# Patient Record
Sex: FEMALE | Race: WHITE | NOT HISPANIC OR LATINO | URBAN - METROPOLITAN AREA
[De-identification: names, ages, dates, MRNs, and addresses within clinical notes are randomized per-mention and may not be internally consistent; named-entity substitution may affect disease eponyms.]

---

## 2019-03-06 ENCOUNTER — OUTPATIENT (OUTPATIENT)
Dept: OUTPATIENT SERVICES | Facility: HOSPITAL | Age: 69
LOS: 1 days | Discharge: HOME | End: 2019-03-06
Payer: MEDICARE

## 2019-03-06 VITALS
TEMPERATURE: 98 F | RESPIRATION RATE: 20 BRPM | HEIGHT: 62 IN | OXYGEN SATURATION: 100 % | DIASTOLIC BLOOD PRESSURE: 93 MMHG | SYSTOLIC BLOOD PRESSURE: 171 MMHG | HEART RATE: 64 BPM | WEIGHT: 205.03 LBS

## 2019-03-06 DIAGNOSIS — Z01.818 ENCOUNTER FOR OTHER PREPROCEDURAL EXAMINATION: ICD-10-CM

## 2019-03-06 DIAGNOSIS — Z29.9 ENCOUNTER FOR PROPHYLACTIC MEASURES, UNSPECIFIED: ICD-10-CM

## 2019-03-06 DIAGNOSIS — M17.11 UNILATERAL PRIMARY OSTEOARTHRITIS, RIGHT KNEE: ICD-10-CM

## 2019-03-06 DIAGNOSIS — Z98.890 OTHER SPECIFIED POSTPROCEDURAL STATES: Chronic | ICD-10-CM

## 2019-03-06 LAB
ABO RH CONFIRMATION: SIGNIFICANT CHANGE UP
ANION GAP SERPL CALC-SCNC: 6 MMOL/L — SIGNIFICANT CHANGE UP (ref 5–17)
APPEARANCE UR: CLEAR — SIGNIFICANT CHANGE UP
BACTERIA # UR AUTO: ABNORMAL
BASOPHILS # BLD AUTO: 0.04 K/UL — SIGNIFICANT CHANGE UP (ref 0–0.2)
BASOPHILS NFR BLD AUTO: 0.5 % — SIGNIFICANT CHANGE UP (ref 0–2)
BILIRUB UR-MCNC: NEGATIVE — SIGNIFICANT CHANGE UP
BLD GP AB SCN SERPL QL: SIGNIFICANT CHANGE UP
BUN SERPL-MCNC: 21 MG/DL — SIGNIFICANT CHANGE UP (ref 7–23)
CALCIUM SERPL-MCNC: 9.7 MG/DL — SIGNIFICANT CHANGE UP (ref 8.5–10.1)
CHLORIDE SERPL-SCNC: 113 MMOL/L — HIGH (ref 96–108)
CO2 SERPL-SCNC: 30 MMOL/L — SIGNIFICANT CHANGE UP (ref 22–31)
COLOR SPEC: YELLOW — SIGNIFICANT CHANGE UP
CREAT SERPL-MCNC: 0.87 MG/DL — SIGNIFICANT CHANGE UP (ref 0.5–1.3)
DIFF PNL FLD: NEGATIVE — SIGNIFICANT CHANGE UP
EOSINOPHIL # BLD AUTO: 0.34 K/UL — SIGNIFICANT CHANGE UP (ref 0–0.5)
EOSINOPHIL NFR BLD AUTO: 4.1 % — SIGNIFICANT CHANGE UP (ref 0–6)
EPI CELLS # UR: SIGNIFICANT CHANGE UP
GLUCOSE SERPL-MCNC: 88 MG/DL — SIGNIFICANT CHANGE UP (ref 70–99)
GLUCOSE UR QL: NEGATIVE MG/DL — SIGNIFICANT CHANGE UP
HCT VFR BLD CALC: 42.5 % — SIGNIFICANT CHANGE UP (ref 34.5–45)
HGB BLD-MCNC: 14.3 G/DL — SIGNIFICANT CHANGE UP (ref 11.5–15.5)
IMM GRANULOCYTES NFR BLD AUTO: 0.1 % — SIGNIFICANT CHANGE UP (ref 0–1.5)
KETONES UR-MCNC: NEGATIVE — SIGNIFICANT CHANGE UP
LEUKOCYTE ESTERASE UR-ACNC: ABNORMAL
LYMPHOCYTES # BLD AUTO: 2.37 K/UL — SIGNIFICANT CHANGE UP (ref 1–3.3)
LYMPHOCYTES # BLD AUTO: 28.3 % — SIGNIFICANT CHANGE UP (ref 13–44)
MCHC RBC-ENTMCNC: 30.8 PG — SIGNIFICANT CHANGE UP (ref 27–34)
MCHC RBC-ENTMCNC: 33.6 GM/DL — SIGNIFICANT CHANGE UP (ref 32–36)
MCV RBC AUTO: 91.4 FL — SIGNIFICANT CHANGE UP (ref 80–100)
MONOCYTES # BLD AUTO: 0.56 K/UL — SIGNIFICANT CHANGE UP (ref 0–0.9)
MONOCYTES NFR BLD AUTO: 6.7 % — SIGNIFICANT CHANGE UP (ref 2–14)
NEUTROPHILS # BLD AUTO: 5.06 K/UL — SIGNIFICANT CHANGE UP (ref 1.8–7.4)
NEUTROPHILS NFR BLD AUTO: 60.3 % — SIGNIFICANT CHANGE UP (ref 43–77)
NITRITE UR-MCNC: NEGATIVE — SIGNIFICANT CHANGE UP
NRBC # BLD: 0 /100 WBCS — SIGNIFICANT CHANGE UP (ref 0–0)
PH UR: 5 — SIGNIFICANT CHANGE UP (ref 5–8)
PLATELET # BLD AUTO: 306 K/UL — SIGNIFICANT CHANGE UP (ref 150–400)
POTASSIUM SERPL-MCNC: 4.4 MMOL/L — SIGNIFICANT CHANGE UP (ref 3.5–5.3)
POTASSIUM SERPL-SCNC: 4.4 MMOL/L — SIGNIFICANT CHANGE UP (ref 3.5–5.3)
PROT UR-MCNC: NEGATIVE MG/DL — SIGNIFICANT CHANGE UP
RBC # BLD: 4.65 M/UL — SIGNIFICANT CHANGE UP (ref 3.8–5.2)
RBC # FLD: 13.1 % — SIGNIFICANT CHANGE UP (ref 10.3–14.5)
RBC CASTS # UR COMP ASSIST: SIGNIFICANT CHANGE UP /HPF (ref 0–4)
SODIUM SERPL-SCNC: 149 MMOL/L — HIGH (ref 135–145)
SP GR SPEC: 1.01 — SIGNIFICANT CHANGE UP (ref 1.01–1.02)
TYPE + AB SCN PNL BLD: SIGNIFICANT CHANGE UP
UROBILINOGEN FLD QL: NEGATIVE MG/DL — SIGNIFICANT CHANGE UP
WBC # BLD: 8.38 K/UL — SIGNIFICANT CHANGE UP (ref 3.8–10.5)
WBC # FLD AUTO: 8.38 K/UL — SIGNIFICANT CHANGE UP (ref 3.8–10.5)
WBC UR QL: SIGNIFICANT CHANGE UP

## 2019-03-06 PROCEDURE — 93010 ELECTROCARDIOGRAM REPORT: CPT

## 2019-03-06 NOTE — H&P PST ADULT - ASSESSMENT
67 y/o female with right knee osteoarthritis. Complain of chronic right knee pain. Scheduled for right TKR.   Plan  1. Stop all NSAIDS, herbal supplements and vitamins for 7 days.  2. NPO at midnight.  3. Take the following medications ( Paxil ) with small sips of water on the morning of your procedure/surgery.  4. Use EZ sponges as directed  5. Use mupirocin as directed  6. Labs, EKG, CXR as per surgeon. STAT PT/INR on admission.  7. PMD/cardiologist visit for optimization prior to surgery as per surgeon.    CAPRINI SCORE [CLOT]  AGE RELATED RISK FACTORS                                                       MOBILITY RELATED FACTORS  [ ] Age 41-60 years                                            (1 Point)                  [ ] Bed rest                                                        (1 Point)  [ x ] Age: 61-74 years                                           (2 Points)                 [ ] Plaster cast                                                   (2 Points)  [ ] Age= 75 years                                              (3 Points)                 [ ] Bed bound for more than 72 hours                 (2 Points)    DISEASE RELATED RISK FACTORS                                               GENDER SPECIFIC FACTORS  [ ] Edema in the lower extremities                       (1 Point)                  [ ] Pregnancy                                                     (1 Point)  [ ] Varicose veins                                               (1 Point)                  [ ] Post-partum < 6 weeks                                   (1 Point)             [x ] BMI > 25 Kg/m2                                            (1 Point)                  [ ] Hormonal therapy  or oral contraception          (1 Point)                 [ ] Sepsis (in the previous month)                        (1 Point)                  [ ] History of pregnancy complications                 (1 point)  [ ] Pneumonia or serious lung disease                                               [ ] Unexplained or recurrent                     (1 Point)           (in the previous month)                               (1 Point)  [ ] Abnormal pulmonary function test                     (1 Point)                 SURGERY RELATED RISK FACTORS  [ ] Acute myocardial infarction                              (1 Point)                 [ ]  Section                                             (1 Point)  [ ] Congestive heart failure (in the previous month)  (1 Point)               [ ] Minor surgery                                                  (1 Point)   [ ] Inflammatory bowel disease                             (1 Point)                 [ ] Arthroscopic surgery                                        (2 Points)  [ ] Central venous access                                      (2 Points)                [ ] General surgery lasting more than 45 minutes   (2 Points)       [ ] Stroke (in the previous month)                          (5 Points)               [ x ] Elective arthroplasty                                         (5 Points)     ()  malignancy                                                             (2 points )                                                                                                                                      HEMATOLOGY RELATED FACTORS                                                 TRAUMA RELATED RISK FACTORS  [ ] Prior episodes of VTE                                     (3 Points)                 [ ] Fracture of the hip, pelvis, or leg                       (5 Points)  [ ] Positive family history for VTE                         (3 Points)                 [ ] Acute spinal cord injury (in the previous month)  (5 Points)  [ ] Prothrombin 41563 A                                     (3 Points)                 [ ] Paralysis  (less than 1 month)                             (5 Points)  [ ] Factor V Leiden                                             (3 Points)                  [ ] Multiple Trauma within 1 month                        (5 Points)  [ ] Lupus anticoagulants                                     (3 Points)                                                           [ ] Anticardiolipin antibodies                               (3 Points)                                                       [ ] High homocysteine in the blood                      (3 Points)                                             [ ] Other congenital or acquired thrombophilia      (3 Points)                                                [ ] Heparin induced thrombocytopenia                  (3 Points)    (  ) Malignancy                                        Total Score [    8      ] 67 y/o female with right knee osteoarthritis. Complain of chronic right knee pain. Scheduled for right TKR.   Plan  1. Stop all NSAIDS, herbal supplements and vitamins for 7 days.  2. NPO at midnight.  3. Take the following medications ( ) with small sips of water on the morning of your procedure/surgery.  4. Use EZ sponges as directed  5. Use mupirocin as directed  6. Labs, EKG, CXR as per surgeon. STAT PT/INR on admission.  7. PMD/cardiologist visit for optimization prior to surgery as per surgeon.    CAPRINI SCORE [CLOT]  AGE RELATED RISK FACTORS                                                       MOBILITY RELATED FACTORS  [ ] Age 41-60 years                                            (1 Point)                  [ ] Bed rest                                                        (1 Point)  [ x ] Age: 61-74 years                                           (2 Points)                 [ ] Plaster cast                                                   (2 Points)  [ ] Age= 75 years                                              (3 Points)                 [ ] Bed bound for more than 72 hours                 (2 Points)    DISEASE RELATED RISK FACTORS                                               GENDER SPECIFIC FACTORS  [ ] Edema in the lower extremities                       (1 Point)                  [ ] Pregnancy                                                     (1 Point)  [ ] Varicose veins                                               (1 Point)                  [ ] Post-partum < 6 weeks                                   (1 Point)             [x ] BMI > 25 Kg/m2                                            (1 Point)                  [ ] Hormonal therapy  or oral contraception          (1 Point)                 [ ] Sepsis (in the previous month)                        (1 Point)                  [ ] History of pregnancy complications                 (1 point)  [ ] Pneumonia or serious lung disease                                               [ ] Unexplained or recurrent                     (1 Point)           (in the previous month)                               (1 Point)  [ ] Abnormal pulmonary function test                     (1 Point)                 SURGERY RELATED RISK FACTORS  [ ] Acute myocardial infarction                              (1 Point)                 [ ]  Section                                             (1 Point)  [ ] Congestive heart failure (in the previous month)  (1 Point)               [ ] Minor surgery                                                  (1 Point)   [ ] Inflammatory bowel disease                             (1 Point)                 [ ] Arthroscopic surgery                                        (2 Points)  [ ] Central venous access                                      (2 Points)                [ ] General surgery lasting more than 45 minutes   (2 Points)       [ ] Stroke (in the previous month)                          (5 Points)               [ x ] Elective arthroplasty                                         (5 Points)     ()  malignancy                                                             (2 points )                                                                                                                                      HEMATOLOGY RELATED FACTORS                                                 TRAUMA RELATED RISK FACTORS  [ ] Prior episodes of VTE                                     (3 Points)                 [ ] Fracture of the hip, pelvis, or leg                       (5 Points)  [ ] Positive family history for VTE                         (3 Points)                 [ ] Acute spinal cord injury (in the previous month)  (5 Points)  [ ] Prothrombin 57783 A                                     (3 Points)                 [ ] Paralysis  (less than 1 month)                             (5 Points)  [ ] Factor V Leiden                                             (3 Points)                  [ ] Multiple Trauma within 1 month                        (5 Points)  [ ] Lupus anticoagulants                                     (3 Points)                                                           [ ] Anticardiolipin antibodies                               (3 Points)                                                       [ ] High homocysteine in the blood                      (3 Points)                                             [ ] Other congenital or acquired thrombophilia      (3 Points)                                                [ ] Heparin induced thrombocytopenia                  (3 Points)    (  ) Malignancy                                        Total Score [    8      ]

## 2019-03-06 NOTE — H&P PST ADULT - FAMILY HISTORY
Mother  Still living? Unknown  Family history of kidney cancer, Age at diagnosis: Age Unknown  Family history of diabetes mellitus in mother, Age at diagnosis: Age Unknown     Father  Still living? Unknown  Family history of COPD (chronic obstructive pulmonary disease), Age at diagnosis: Age Unknown  Family history of heart valve insufficiency, Age at diagnosis: Age Unknown     Sibling  Still living? Unknown  Family history of lung disease, Age at diagnosis: Age Unknown

## 2019-03-06 NOTE — H&P PST ADULT - PMH
Constipation    COPD (chronic obstructive pulmonary disease)  emphysema  Diabetes mellitus  metformin discontinued on 01/2019 - diet management  HTN (hypertension)    Hyperlipidemia    OA (osteoarthritis) of knee  right knee  Obesity    Proteinuria  h/o  Psoriasis    Stuttering  on Paxil  Vitamin D deficiency Constipation    COPD (chronic obstructive pulmonary disease)  emphysema  Diabetes mellitus  metformin discontinued on 01/2019 - diet management  HTN (hypertension)    Hyperlipidemia    OA (osteoarthritis) of knee  right knee  Obesity    Proteinuria  h/o  Psoriasis    Sleep apnea    Stuttering  on Paxil  Vitamin D deficiency

## 2019-03-06 NOTE — H&P PST ADULT - PSH
H/O cardiac catheterization  2016  History of breast surgery  excision of breast hematoma  S/P carpal tunnel release

## 2019-03-06 NOTE — H&P PST ADULT - HISTORY OF PRESENT ILLNESS
69 y/o female with right knee osteoarthritis. Complain of chronic right knee pain. Takes Advil with mild pain relief. Scheduled for right TKR.

## 2019-03-07 LAB
MRSA PCR RESULT.: SIGNIFICANT CHANGE UP
S AUREUS DNA NOSE QL NAA+PROBE: DETECTED

## 2019-03-21 ENCOUNTER — RESULT REVIEW (OUTPATIENT)
Age: 69
End: 2019-03-21

## 2019-03-21 ENCOUNTER — INPATIENT (INPATIENT)
Facility: HOSPITAL | Age: 69
LOS: 2 days | Discharge: NURSING, REHAB OR LONG-TERM CARE | End: 2019-03-24
Attending: ORTHOPAEDIC SURGERY | Admitting: ORTHOPAEDIC SURGERY
Payer: MEDICARE

## 2019-03-21 VITALS
HEART RATE: 68 BPM | HEIGHT: 62 IN | SYSTOLIC BLOOD PRESSURE: 140 MMHG | OXYGEN SATURATION: 100 % | TEMPERATURE: 98 F | DIASTOLIC BLOOD PRESSURE: 85 MMHG | RESPIRATION RATE: 16 BRPM | WEIGHT: 205.47 LBS

## 2019-03-21 DIAGNOSIS — Z98.890 OTHER SPECIFIED POSTPROCEDURAL STATES: Chronic | ICD-10-CM

## 2019-03-21 LAB
ANION GAP SERPL CALC-SCNC: 6 MMOL/L — SIGNIFICANT CHANGE UP (ref 5–17)
BUN SERPL-MCNC: 24 MG/DL — HIGH (ref 7–23)
CALCIUM SERPL-MCNC: 9 MG/DL — SIGNIFICANT CHANGE UP (ref 8.5–10.1)
CHLORIDE SERPL-SCNC: 108 MMOL/L — SIGNIFICANT CHANGE UP (ref 96–108)
CO2 SERPL-SCNC: 29 MMOL/L — SIGNIFICANT CHANGE UP (ref 22–31)
CREAT SERPL-MCNC: 0.96 MG/DL — SIGNIFICANT CHANGE UP (ref 0.5–1.3)
GLUCOSE SERPL-MCNC: 124 MG/DL — HIGH (ref 70–99)
HCT VFR BLD CALC: 35.2 % — SIGNIFICANT CHANGE UP (ref 34.5–45)
HGB BLD-MCNC: 11.6 G/DL — SIGNIFICANT CHANGE UP (ref 11.5–15.5)
INR BLD: 1.04 RATIO — SIGNIFICANT CHANGE UP (ref 0.88–1.16)
MCHC RBC-ENTMCNC: 30.3 PG — SIGNIFICANT CHANGE UP (ref 27–34)
MCHC RBC-ENTMCNC: 33 GM/DL — SIGNIFICANT CHANGE UP (ref 32–36)
MCV RBC AUTO: 91.9 FL — SIGNIFICANT CHANGE UP (ref 80–100)
NRBC # BLD: 0 /100 WBCS — SIGNIFICANT CHANGE UP (ref 0–0)
PLATELET # BLD AUTO: 245 K/UL — SIGNIFICANT CHANGE UP (ref 150–400)
POTASSIUM SERPL-MCNC: 3.6 MMOL/L — SIGNIFICANT CHANGE UP (ref 3.5–5.3)
POTASSIUM SERPL-SCNC: 3.6 MMOL/L — SIGNIFICANT CHANGE UP (ref 3.5–5.3)
PROTHROM AB SERPL-ACNC: 11.6 SEC — SIGNIFICANT CHANGE UP (ref 10–12.9)
RBC # BLD: 3.83 M/UL — SIGNIFICANT CHANGE UP (ref 3.8–5.2)
RBC # FLD: 13.1 % — SIGNIFICANT CHANGE UP (ref 10.3–14.5)
SODIUM SERPL-SCNC: 143 MMOL/L — SIGNIFICANT CHANGE UP (ref 135–145)
WBC # BLD: 7.38 K/UL — SIGNIFICANT CHANGE UP (ref 3.8–10.5)
WBC # FLD AUTO: 7.38 K/UL — SIGNIFICANT CHANGE UP (ref 3.8–10.5)

## 2019-03-21 PROCEDURE — 88305 TISSUE EXAM BY PATHOLOGIST: CPT | Mod: 26

## 2019-03-21 PROCEDURE — 73560 X-RAY EXAM OF KNEE 1 OR 2: CPT | Mod: 26,RT

## 2019-03-21 PROCEDURE — 99223 1ST HOSP IP/OBS HIGH 75: CPT

## 2019-03-21 RX ORDER — OXYCODONE HYDROCHLORIDE 5 MG/1
20 TABLET ORAL ONCE
Qty: 0 | Refills: 0 | Status: DISCONTINUED | OUTPATIENT
Start: 2019-03-21 | End: 2019-03-21

## 2019-03-21 RX ORDER — RIVAROXABAN 15 MG-20MG
10 KIT ORAL DAILY
Qty: 0 | Refills: 0 | Status: DISCONTINUED | OUTPATIENT
Start: 2019-03-21 | End: 2019-03-24

## 2019-03-21 RX ORDER — CELECOXIB 200 MG/1
200 CAPSULE ORAL ONCE
Qty: 0 | Refills: 0 | Status: COMPLETED | OUTPATIENT
Start: 2019-03-21 | End: 2019-03-21

## 2019-03-21 RX ORDER — LOSARTAN POTASSIUM 100 MG/1
50 TABLET, FILM COATED ORAL DAILY
Qty: 0 | Refills: 0 | Status: DISCONTINUED | OUTPATIENT
Start: 2019-03-21 | End: 2019-03-24

## 2019-03-21 RX ORDER — OXYCODONE HYDROCHLORIDE 5 MG/1
10 TABLET ORAL EVERY 4 HOURS
Qty: 0 | Refills: 0 | Status: DISCONTINUED | OUTPATIENT
Start: 2019-03-21 | End: 2019-03-22

## 2019-03-21 RX ORDER — DIPHENHYDRAMINE HCL 50 MG
25 CAPSULE ORAL EVERY 4 HOURS
Qty: 0 | Refills: 0 | Status: DISCONTINUED | OUTPATIENT
Start: 2019-03-21 | End: 2019-03-24

## 2019-03-21 RX ORDER — HYDROMORPHONE HYDROCHLORIDE 2 MG/ML
0.5 INJECTION INTRAMUSCULAR; INTRAVENOUS; SUBCUTANEOUS
Qty: 0 | Refills: 0 | Status: DISCONTINUED | OUTPATIENT
Start: 2019-03-21 | End: 2019-03-21

## 2019-03-21 RX ORDER — ACETAMINOPHEN 500 MG
650 TABLET ORAL EVERY 6 HOURS
Qty: 0 | Refills: 0 | Status: DISCONTINUED | OUTPATIENT
Start: 2019-03-21 | End: 2019-03-24

## 2019-03-21 RX ORDER — ALBUTEROL 90 UG/1
2 AEROSOL, METERED ORAL EVERY 6 HOURS
Qty: 0 | Refills: 0 | Status: DISCONTINUED | OUTPATIENT
Start: 2019-03-21 | End: 2019-03-24

## 2019-03-21 RX ORDER — ONDANSETRON 8 MG/1
4 TABLET, FILM COATED ORAL EVERY 6 HOURS
Qty: 0 | Refills: 0 | Status: DISCONTINUED | OUTPATIENT
Start: 2019-03-21 | End: 2019-03-24

## 2019-03-21 RX ORDER — PANTOPRAZOLE SODIUM 20 MG/1
40 TABLET, DELAYED RELEASE ORAL ONCE
Qty: 0 | Refills: 0 | Status: COMPLETED | OUTPATIENT
Start: 2019-03-21 | End: 2019-03-21

## 2019-03-21 RX ORDER — HYDROCHLOROTHIAZIDE 25 MG
12.5 TABLET ORAL DAILY
Qty: 0 | Refills: 0 | Status: DISCONTINUED | OUTPATIENT
Start: 2019-03-21 | End: 2019-03-21

## 2019-03-21 RX ORDER — PANTOPRAZOLE SODIUM 20 MG/1
40 TABLET, DELAYED RELEASE ORAL
Qty: 0 | Refills: 0 | Status: DISCONTINUED | OUTPATIENT
Start: 2019-03-21 | End: 2019-03-24

## 2019-03-21 RX ORDER — OXYCODONE HYDROCHLORIDE 5 MG/1
5 TABLET ORAL EVERY 4 HOURS
Qty: 0 | Refills: 0 | Status: DISCONTINUED | OUTPATIENT
Start: 2019-03-21 | End: 2019-03-22

## 2019-03-21 RX ORDER — ACETAMINOPHEN 500 MG
1000 TABLET ORAL ONCE
Qty: 0 | Refills: 0 | Status: COMPLETED | OUTPATIENT
Start: 2019-03-21 | End: 2019-03-21

## 2019-03-21 RX ORDER — DOCUSATE SODIUM 100 MG
100 CAPSULE ORAL THREE TIMES A DAY
Qty: 0 | Refills: 0 | Status: DISCONTINUED | OUTPATIENT
Start: 2019-03-21 | End: 2019-03-24

## 2019-03-21 RX ORDER — ONDANSETRON 8 MG/1
4 TABLET, FILM COATED ORAL ONCE
Qty: 0 | Refills: 0 | Status: DISCONTINUED | OUTPATIENT
Start: 2019-03-21 | End: 2019-03-21

## 2019-03-21 RX ORDER — SIMVASTATIN 20 MG/1
20 TABLET, FILM COATED ORAL AT BEDTIME
Qty: 0 | Refills: 0 | Status: DISCONTINUED | OUTPATIENT
Start: 2019-03-21 | End: 2019-03-24

## 2019-03-21 RX ORDER — ASPIRIN/CALCIUM CARB/MAGNESIUM 324 MG
325 TABLET ORAL
Qty: 0 | Refills: 0 | Status: DISCONTINUED | OUTPATIENT
Start: 2019-03-21 | End: 2019-03-21

## 2019-03-21 RX ORDER — ACETAMINOPHEN 500 MG
650 TABLET ORAL EVERY 6 HOURS
Qty: 0 | Refills: 0 | Status: DISCONTINUED | OUTPATIENT
Start: 2019-03-22 | End: 2019-03-24

## 2019-03-21 RX ORDER — OXYCODONE HYDROCHLORIDE 5 MG/1
10 TABLET ORAL ONCE
Qty: 0 | Refills: 0 | Status: DISCONTINUED | OUTPATIENT
Start: 2019-03-21 | End: 2019-03-21

## 2019-03-21 RX ORDER — DIPHENHYDRAMINE HCL 50 MG
25 CAPSULE ORAL AT BEDTIME
Qty: 0 | Refills: 0 | Status: DISCONTINUED | OUTPATIENT
Start: 2019-03-21 | End: 2019-03-24

## 2019-03-21 RX ORDER — FOLIC ACID 0.8 MG
1 TABLET ORAL DAILY
Qty: 0 | Refills: 0 | Status: DISCONTINUED | OUTPATIENT
Start: 2019-03-21 | End: 2019-03-24

## 2019-03-21 RX ORDER — MAGNESIUM HYDROXIDE 400 MG/1
30 TABLET, CHEWABLE ORAL DAILY
Qty: 0 | Refills: 0 | Status: DISCONTINUED | OUTPATIENT
Start: 2019-03-21 | End: 2019-03-24

## 2019-03-21 RX ORDER — CEFAZOLIN SODIUM 1 G
2000 VIAL (EA) INJECTION EVERY 8 HOURS
Qty: 0 | Refills: 0 | Status: COMPLETED | OUTPATIENT
Start: 2019-03-21 | End: 2019-03-22

## 2019-03-21 RX ORDER — ACETAMINOPHEN 500 MG
650 TABLET ORAL ONCE
Qty: 0 | Refills: 0 | Status: COMPLETED | OUTPATIENT
Start: 2019-03-21 | End: 2019-03-21

## 2019-03-21 RX ORDER — ASCORBIC ACID 60 MG
500 TABLET,CHEWABLE ORAL
Qty: 0 | Refills: 0 | Status: DISCONTINUED | OUTPATIENT
Start: 2019-03-21 | End: 2019-03-24

## 2019-03-21 RX ORDER — MAGNESIUM HYDROXIDE 400 MG/1
30 TABLET, CHEWABLE ORAL
Qty: 0 | Refills: 0 | Status: DISCONTINUED | OUTPATIENT
Start: 2019-03-21 | End: 2019-03-24

## 2019-03-21 RX ORDER — SODIUM CHLORIDE 9 MG/ML
1000 INJECTION, SOLUTION INTRAVENOUS
Qty: 0 | Refills: 0 | Status: DISCONTINUED | OUTPATIENT
Start: 2019-03-21 | End: 2019-03-21

## 2019-03-21 RX ORDER — POLYETHYLENE GLYCOL 3350 17 G/17G
17 POWDER, FOR SOLUTION ORAL DAILY
Qty: 0 | Refills: 0 | Status: DISCONTINUED | OUTPATIENT
Start: 2019-03-21 | End: 2019-03-24

## 2019-03-21 RX ORDER — SENNA PLUS 8.6 MG/1
2 TABLET ORAL AT BEDTIME
Qty: 0 | Refills: 0 | Status: DISCONTINUED | OUTPATIENT
Start: 2019-03-21 | End: 2019-03-24

## 2019-03-21 RX ORDER — BENZOCAINE AND MENTHOL 5; 1 G/100ML; G/100ML
1 LIQUID ORAL
Qty: 0 | Refills: 0 | Status: DISCONTINUED | OUTPATIENT
Start: 2019-03-21 | End: 2019-03-24

## 2019-03-21 RX ORDER — SODIUM CHLORIDE 9 MG/ML
1000 INJECTION, SOLUTION INTRAVENOUS
Qty: 0 | Refills: 0 | Status: DISCONTINUED | OUTPATIENT
Start: 2019-03-21 | End: 2019-03-24

## 2019-03-21 RX ORDER — FERROUS SULFATE 325(65) MG
325 TABLET ORAL
Qty: 0 | Refills: 0 | Status: DISCONTINUED | OUTPATIENT
Start: 2019-03-21 | End: 2019-03-24

## 2019-03-21 RX ORDER — TRAMADOL HYDROCHLORIDE 50 MG/1
50 TABLET ORAL EVERY 6 HOURS
Qty: 0 | Refills: 0 | Status: DISCONTINUED | OUTPATIENT
Start: 2019-03-21 | End: 2019-03-24

## 2019-03-21 RX ADMIN — ONDANSETRON 4 MILLIGRAM(S): 8 TABLET, FILM COATED ORAL at 16:06

## 2019-03-21 RX ADMIN — RIVAROXABAN 10 MILLIGRAM(S): KIT at 22:00

## 2019-03-21 RX ADMIN — OXYCODONE HYDROCHLORIDE 20 MILLIGRAM(S): 5 TABLET ORAL at 07:21

## 2019-03-21 RX ADMIN — Medication 1 APPLICATION(S): at 21:59

## 2019-03-21 RX ADMIN — CELECOXIB 200 MILLIGRAM(S): 200 CAPSULE ORAL at 07:21

## 2019-03-21 RX ADMIN — Medication 100 MILLIGRAM(S): at 16:31

## 2019-03-21 RX ADMIN — Medication 650 MILLIGRAM(S): at 07:21

## 2019-03-21 RX ADMIN — SIMVASTATIN 20 MILLIGRAM(S): 20 TABLET, FILM COATED ORAL at 22:00

## 2019-03-21 RX ADMIN — OXYCODONE HYDROCHLORIDE 10 MILLIGRAM(S): 5 TABLET ORAL at 11:53

## 2019-03-21 RX ADMIN — OXYCODONE HYDROCHLORIDE 10 MILLIGRAM(S): 5 TABLET ORAL at 12:00

## 2019-03-21 RX ADMIN — Medication 650 MILLIGRAM(S): at 23:14

## 2019-03-21 RX ADMIN — HYDROMORPHONE HYDROCHLORIDE 0.5 MILLIGRAM(S): 2 INJECTION INTRAMUSCULAR; INTRAVENOUS; SUBCUTANEOUS at 11:50

## 2019-03-21 RX ADMIN — Medication 10 MILLIGRAM(S): at 22:28

## 2019-03-21 RX ADMIN — Medication 30 MILLIGRAM(S): at 22:00

## 2019-03-21 RX ADMIN — HYDROMORPHONE HYDROCHLORIDE 0.5 MILLIGRAM(S): 2 INJECTION INTRAMUSCULAR; INTRAVENOUS; SUBCUTANEOUS at 11:36

## 2019-03-21 RX ADMIN — SODIUM CHLORIDE 75 MILLILITER(S): 9 INJECTION, SOLUTION INTRAVENOUS at 11:38

## 2019-03-21 RX ADMIN — Medication 1000 MILLIGRAM(S): at 18:09

## 2019-03-21 RX ADMIN — Medication 100 MILLIGRAM(S): at 22:00

## 2019-03-21 RX ADMIN — SODIUM CHLORIDE 75 MILLILITER(S): 9 INJECTION, SOLUTION INTRAVENOUS at 22:28

## 2019-03-21 RX ADMIN — PANTOPRAZOLE SODIUM 40 MILLIGRAM(S): 20 TABLET, DELAYED RELEASE ORAL at 16:31

## 2019-03-21 RX ADMIN — Medication 650 MILLIGRAM(S): at 23:15

## 2019-03-21 RX ADMIN — Medication 400 MILLIGRAM(S): at 17:54

## 2019-03-21 NOTE — PHYSICAL THERAPY INITIAL EVALUATION ADULT - MODALITIES TREATMENT COMMENTS
shiv tx well with no c/o during transfer, gait and therex training. left in supine, all needs in reach, alarm in place

## 2019-03-21 NOTE — PROGRESS NOTE ADULT - SUBJECTIVE AND OBJECTIVE BOX
PT seen at bedside doing well, denies N.T RLE , denies pain, no other complaints    PE Right knee   dressing c/d/i   immobilizer intact   FROM ankle and toes  + EHL FHL GS TA   SILT   DP intact

## 2019-03-21 NOTE — CONSULT NOTE ADULT - SUBJECTIVE AND OBJECTIVE BOX
Date of Service 03-21-19 @ 16:21    Patient is a 68y old  Female who presents with a chief complaint of "Right knee pain"      HPI: Pt is a 67 y/o obese female with h/o HTN, hyperlipidemia, diet controlled type 2 diabetes, COPD, hyperlipidemia, sleep apnea, osteoarthritis who has been having increasing Rt knee pain.  Since she failed conservative measures, affecting her quality of life and ADLs she was admitted for elective Rt total knee replacement.  Postop medicine consult called for comanagement.  Pt seen and examined, c/o nausea and epigastric discomfort with heart burn.  No CP or SOB.        PAST MEDICAL & SURGICAL HISTORY:  Sleep apnea  Constipation  Vitamin D deficiency  Psoriasis  OA (osteoarthritis) of knee: right knee  Stuttering: on Paxil  Diabetes mellitus: metformin discontinued on 01/2019 - diet management  Proteinuria: h/o  COPD (chronic obstructive pulmonary disease): emphysema  HTN (hypertension)  Hyperlipidemia  Obesity  H/O cardiac catheterization: 2016  History of breast surgery: excision of breast hematoma  S/P carpal tunnel release      Allergies    Zantac (Other)    Intolerances        MEDICATIONS  (STANDING):  ascorbic acid 500 milliGRAM(s) Oral two times a day  calcium carbonate 1250 mG  + Vitamin D (OsCal 500 + D) 1 Tablet(s) Oral three times a day  ceFAZolin   IVPB 2000 milliGRAM(s) IV Intermittent every 8 hours  clobetasol 0.05% Ointment 1 Application(s) Topical two times a day  docusate sodium 100 milliGRAM(s) Oral three times a day  ferrous    sulfate 325 milliGRAM(s) Oral three times a day with meals  folic acid 1 milliGRAM(s) Oral daily  hydrochlorothiazide 12.5 milliGRAM(s) Oral daily  lactated ringers. 1000 milliLiter(s) (75 mL/Hr) IV Continuous <Continuous>  losartan 50 milliGRAM(s) Oral daily  multivitamin 1 Tablet(s) Oral daily  pantoprazole    Tablet 40 milliGRAM(s) Oral before breakfast  pantoprazole  Injectable 40 milliGRAM(s) IV Push once  PARoxetine 30 milliGRAM(s) Oral daily  polyethylene glycol 3350 17 Gram(s) Oral daily  rivaroxaban 10 milliGRAM(s) Oral daily  simvastatin 20 milliGRAM(s) Oral at bedtime    MEDICATIONS  (PRN):  acetaminophen   Tablet .. 650 milliGRAM(s) Oral every 6 hours PRN Temp greater or equal to 38C (100.4F), Mild Pain (1 - 3)  acetaminophen  IVPB .. 1000 milliGRAM(s) IV Intermittent once PRN Severe Pain (7 - 10)  ALBUTerol    90 MICROgram(s) HFA Inhaler 2 Puff(s) Inhalation every 6 hours PRN Shortness of Breath and/or Wheezing  aluminum hydroxide/magnesium hydroxide/simethicone Suspension 30 milliLiter(s) Oral four times a day PRN Indigestion  benzocaine 15 mG/menthol 3.6 mG Lozenge 1 Lozenge Oral every 3 hours PRN Sore Throat  diphenhydrAMINE 25 milliGRAM(s) Oral every 4 hours PRN Rash and/or Itching  diphenhydrAMINE 25 milliGRAM(s) Oral at bedtime PRN Insomnia  magnesium hydroxide Suspension 30 milliLiter(s) Oral daily PRN Constipation  magnesium hydroxide Suspension 30 milliLiter(s) Oral four times a day PRN Constipation  ondansetron Injectable 4 milliGRAM(s) IV Push every 6 hours PRN Nausea and/or Vomiting  oxyCODONE    IR 5 milliGRAM(s) Oral every 4 hours PRN Moderate Pain (4 - 6)  oxyCODONE    IR 10 milliGRAM(s) Oral every 4 hours PRN Severe Pain (7 - 10)  senna 2 Tablet(s) Oral at bedtime PRN Constipation  traMADol 50 milliGRAM(s) Oral every 6 hours PRN Mild Pain (1 - 3)      FAMILY HISTORY:  Family history of lung disease (Sibling)  Family history of heart valve insufficiency (Father)  Family history of COPD (chronic obstructive pulmonary disease) (Father)  Family history of diabetes mellitus in mother (Mother)  Family history of kidney cancer (Mother): mother      Social History: , lives with children, former smoker (smoked 1 ppd for 40 year, quit 2016), denies ETOH use      Vital Signs Last 24 Hrs  T(C): 36.7 (21 Mar 2019 13:15), Max: 37.5 (21 Mar 2019 12:55)  T(F): 98.1 (21 Mar 2019 13:15), Max: 99.5 (21 Mar 2019 12:55)  HR: 75 (21 Mar 2019 13:15) (68 - 82)  BP: 125/70 (21 Mar 2019 13:15) (117/69 - 140/85)  BP(mean): 82 (21 Mar 2019 13:15) (82 - 82)  RR: 18 (21 Mar 2019 13:15) (10 - 19)  SpO2: 98% (21 Mar 2019 13:15) (95% - 100%)    Daily Height in cm: 157.48 (21 Mar 2019 07:02)    Daily     I&O's Summary    21 Mar 2019 07:01  -  21 Mar 2019 16:21  --------------------------------------------------------  IN: 1328 mL / OUT: 0 mL / NET: 1328 mL          Data                          11.6   7.38  )-----------( 245      ( 21 Mar 2019 11:48 )             35.2       03-21    143  |  108  |  24<H>  ----------------------------<  124<H>  3.6   |  29  |  0.96    Ca    9.0      21 Mar 2019 11:48                      PT/INR - ( 21 Mar 2019 07:00 )   PT: 11.6 sec;   INR: 1.04 ratio

## 2019-03-21 NOTE — PHYSICAL THERAPY INITIAL EVALUATION ADULT - ADDITIONAL COMMENTS
lives with family in a private home, several steps (without railing) to enter  sleeps on first floor

## 2019-03-21 NOTE — CONSULT NOTE ADULT - SUBJECTIVE AND OBJECTIVE BOX
HPI:      Patient is a 68y old  Female who presents with a chief complaint of inc right knee pain, h/o OA, Now S/P right TKR    Consulted by Dr. Rodas   for VTE prophylaxis, risk stratification, and anticoagulation management.    PAST MEDICAL & SURGICAL HISTORY:  Sleep apnea  Constipation  Vitamin D deficiency  Psoriasis  OA (osteoarthritis) of knee: right knee  Stuttering: on Paxil  Diabetes mellitus: metformin discontinued on 2019 - diet management  Proteinuria: h/o  COPD (chronic obstructive pulmonary disease): emphysema  HTN (hypertension)  Hyperlipidemia  Obesity  H/O cardiac catheterization: 2016  History of breast surgery: excision of breast hematoma  S/P carpal tunnel release          CrCl:    Caprini VTE Risk Score: CAPRINI SCORE  AGE RELATED RISK FACTORS                                                       MOBILITY RELATED FACTORS  [ ] Age 41-60 years                                            (1 Point)                  [ ] Bed rest                                                        (1 Point)  [ ] Age: 61-74 years                                           (2 Points)                [ ] Plaster cast                                                   (2 Points)  [ ] Age= 75 years                                              (3 Points)                 [ ] Bed bound for more than 72 hours                   (2 Points)    DISEASE RELATED RISK FACTORS                                               GENDER SPECIFIC FACTORS  [ ] Edema in the lower extremities                       (1 Point)           [ ] Pregnancy                                                            (1 Point)  [ ] Varicose veins                                               (1 Point)                  [ ] Post-partum < 6 weeks                                      (1 Point)             [ x] BMI > 25 Kg/m2                                            (1 Point)                  [ ] Hormonal therapy or oral contraception       (1 Point)                 [ ] Sepsis (in the previous month)                        (1 Point)             [ ] History of pregnancy complications                (1Point)  [ ] Pneumonia or serious lung disease                                             [ ] Unexplained or recurrent  (>/= 3), premature                                 (In the previous month)                               (1 Point)                birth with toxemia or growth-restricted infant (1 Point)  [ ] Abnormal pulmonary function test            (1 Point)                                   SURGERY RELATED RISK FACTORS  [ ] Acute myocardial infarction                       (1 Point)                  [ ]  Section                                         (1 Point)  [ ] Congestive heart failure (in the previous month) (1 Point)   [ ] Minor surgery   lasting <45 minutes       (1 Point)   [ ] Inflammatory bowel disease                             (1 Point)          [ ] Arthroscopic surgery                                  (2 Points)  [ ] Central venous access                                    (2 Points)            [ ] General surgery lasting >45 minutes      (2 Points)       [ ] Stroke (in the previous month)                  (5 Points)            [x ] Elective major lower extremity arthroplasty (5 Points)                                   [  ] Malignancy (present or past include skin melanoma                                          but exclude  basal skin cell)    (2 points)                                      TRAUMA RELATED RISK FACTORS                HEMATOLOGY RELATED FACTORS                                  [ ] Fracture of the hip, pelvis, or leg                       (5 Points)  [ ] Prior episodes of VTE                                     (3 Points)          [ ] Acute spinal cord injury (in the previous month)  (5 Points)  [ ] Positive family history for VTE                         (3 Points)       [ ] Paralysis (less than 1 month)                          (5 Points)  [ ] Prothrombin 50579 A                                      (3 Points)         [ ] Multiple Trauma (within 1month)                 (5Points)                                                                                                                                                                [ ] Factor V Leiden                                          (3 Points)                                OTHER RISK FACTORS                          [ ] Lupus anticoagulants                                     (3 Points)                       [ ] BMI > 40                          (1 Point)                                                         [ ] Anticardiolipin antibodies                                (3 Points)                   [ ] Smoking                              (1Point)                                                [ ] High homocysteine in the blood                      (3 Points)                [  ] Diabetes requiring insulin (1point)                         [ ] Other congenital or acquired thrombophilia       (3 Points)          [  ] Chemotherapy                   (1 Point)  [ ] Heparin induced thrombocytopenia                  (3 Points)             [  ] Blood Transfusion                (1 point)                                                                                                             Total Score [  8    ]                                                                                                                                                                                                                                                                                                                                                                                                                                           IMPROVE Bleeding Risk Score    Falls Risk:   High (  )  Mod (  )  Low (  )      FAMILY HISTORY:  Family history of lung disease (Sibling)  Family history of heart valve insufficiency (Father)  Family history of COPD (chronic obstructive pulmonary disease) (Father)  Family history of diabetes mellitus in mother (Mother)  Family history of kidney cancer (Mother): mother    Denies any personal or familial history of clotting or bleeding disorders.    Allergies    Zantac (Other)    Intolerances        REVIEW OF SYSTEMS    (  )Fever	     (  )Constipation	(  )SOB				(  )Headache	(  )Dysuria  (  )Chills	     (  )Melena	(  )Dyspnea present on exertion	                    (  )Dizziness                    (  )Polyuria  (  )Nausea	     (  )Hematochezia	(  )Cough			                    (  )Syncope   	(  )Hematuria  (  )Vomiting    (  )Chest Pain	(  )Wheezing			(  )Weakness  (  )Diarrhea     (  )Palpitations	(  )Anorexia			( x )joint pain    All  other review of systems negative: Yes          PHYSICAL EXAM:    Constitutional: Appears Well    Neurological: A& O x 3    Skin: Warm    Respiratory and Thorax: normal effort; Breath sounds: normal; No rales/wheezing/rhonchi  	  Cardiovascular: S1, S2, regular, NMBR	    Gastrointestinal: BS + x 4Q, nontender	    Genitourinary:  Bladder nondistended, nontender    Musculoskeletal:   General Right:   + muscle/joint tenderness,   normal tone, no joint swelling,   ROM: limited	    General Left:   no muscle/joint tenderness,   normal tone, no joint swelling,   ROM: lfull    Knee:  Right: Incision: ; Dressing CDI;                 Lower extrems:   Right: no calf tenderness              negative robbin's sign               + pedal pulses    Left:   no calf tenderness              negative robbin's sign               + pedal pulses                          11.6   7.38  )-----------( 245      ( 21 Mar 2019 11:48 )             35.2       03-21    143  |  108  |  24<H>  ----------------------------<  124<H>  3.6   |  29  |  0.96    Ca    9.0      21 Mar 2019 11:48        PT/INR - ( 21 Mar 2019 07:00 )   PT: 11.6 sec;   INR: 1.04 ratio         				    MEDICATIONS  (STANDING):  ascorbic acid 500 milliGRAM(s) Oral two times a day  aspirin enteric coated 325 milliGRAM(s) Oral two times a day  calcium carbonate 1250 mG  + Vitamin D (OsCal 500 + D) 1 Tablet(s) Oral three times a day  ceFAZolin   IVPB 2000 milliGRAM(s) IV Intermittent every 8 hours  clobetasol 0.05% Ointment 1 Application(s) Topical two times a day  docusate sodium 100 milliGRAM(s) Oral three times a day  ferrous    sulfate 325 milliGRAM(s) Oral three times a day with meals  folic acid 1 milliGRAM(s) Oral daily  hydrochlorothiazide 12.5 milliGRAM(s) Oral daily  lactated ringers. 1000 milliLiter(s) IV Continuous <Continuous>  losartan 50 milliGRAM(s) Oral daily  multivitamin 1 Tablet(s) Oral daily  pantoprazole    Tablet 40 milliGRAM(s) Oral before breakfast  PARoxetine 30 milliGRAM(s) Oral daily  polyethylene glycol 3350 17 Gram(s) Oral daily  simvastatin 20 milliGRAM(s) Oral at bedtime          DVT Prophylaxis:  LMWH                   (  )  Heparin SQ           (  )  Coumadin             (  )  Xarelto                  (  )  Eliquis                   (  )  Venodynes           (  )  Ambulation          (  )  UFH                       (  )  Contraindicated  (  )  EC ASPIRIN       (  ) HPI:      Patient is a 68y old  Female who presents with a chief complaint of inc right knee pain, h/o OA, Now S/P right TKR    Consulted by Dr. Rodas   for VTE prophylaxis, risk stratification, and anticoagulation management.    PAST MEDICAL & SURGICAL HISTORY:  Sleep apnea  Constipation  Vitamin D deficiency  Psoriasis  OA (osteoarthritis) of knee: right knee  Stuttering: on Paxil  Diabetes mellitus: metformin discontinued on 2019 - diet management  Proteinuria: h/o  COPD (chronic obstructive pulmonary disease): emphysema  HTN (hypertension)  Hyperlipidemia  Obesity  H/O cardiac catheterization: 2016  History of breast surgery: excision of breast hematoma  S/P carpal tunnel release          CrCl:    Caprini VTE Risk Score: CAPRINI SCORE  AGE RELATED RISK FACTORS                                                       MOBILITY RELATED FACTORS  [ ] Age 41-60 years                                            (1 Point)                  [ ] Bed rest                                                        (1 Point)  [ ] Age: 61-74 years                                           (2 Points)                [ ] Plaster cast                                                   (2 Points)  [ ] Age= 75 years                                              (3 Points)                 [ ] Bed bound for more than 72 hours                   (2 Points)    DISEASE RELATED RISK FACTORS                                               GENDER SPECIFIC FACTORS  [ ] Edema in the lower extremities                       (1 Point)           [ ] Pregnancy                                                            (1 Point)  [ ] Varicose veins                                               (1 Point)                  [ ] Post-partum < 6 weeks                                      (1 Point)             [ x] BMI > 25 Kg/m2                                            (1 Point)                  [ ] Hormonal therapy or oral contraception       (1 Point)                 [ ] Sepsis (in the previous month)                        (1 Point)             [ ] History of pregnancy complications                (1Point)  [ ] Pneumonia or serious lung disease                                             [ ] Unexplained or recurrent  (>/= 3), premature                                 (In the previous month)                               (1 Point)                birth with toxemia or growth-restricted infant (1 Point)  [ ] Abnormal pulmonary function test            (1 Point)                                   SURGERY RELATED RISK FACTORS  [ ] Acute myocardial infarction                       (1 Point)                  [ ]  Section                                         (1 Point)  [ ] Congestive heart failure (in the previous month) (1 Point)   [ ] Minor surgery   lasting <45 minutes       (1 Point)   [ ] Inflammatory bowel disease                             (1 Point)          [ ] Arthroscopic surgery                                  (2 Points)  [ ] Central venous access                                    (2 Points)            [ ] General surgery lasting >45 minutes      (2 Points)       [ ] Stroke (in the previous month)                  (5 Points)            [x ] Elective major lower extremity arthroplasty (5 Points)                                   [  ] Malignancy (present or past include skin melanoma                                          but exclude  basal skin cell)    (2 points)                                      TRAUMA RELATED RISK FACTORS                HEMATOLOGY RELATED FACTORS                                  [ ] Fracture of the hip, pelvis, or leg                       (5 Points)  [ ] Prior episodes of VTE                                     (3 Points)          [ ] Acute spinal cord injury (in the previous month)  (5 Points)  [ ] Positive family history for VTE                         (3 Points)       [ ] Paralysis (less than 1 month)                          (5 Points)  [ ] Prothrombin 49432 A                                      (3 Points)         [ ] Multiple Trauma (within 1month)                 (5Points)                                                                                                                                                                [ ] Factor V Leiden                                          (3 Points)                                OTHER RISK FACTORS                          [ ] Lupus anticoagulants                                     (3 Points)                       [ ] BMI > 40                          (1 Point)                                                         [ ] Anticardiolipin antibodies                                (3 Points)                   [ ] Smoking                              (1Point)                                                [ ] High homocysteine in the blood                      (3 Points)                [  ] Diabetes requiring insulin (1point)                         [ ] Other congenital or acquired thrombophilia       (3 Points)          [  ] Chemotherapy                   (1 Point)  [ ] Heparin induced thrombocytopenia                  (3 Points)             [  ] Blood Transfusion                (1 point)                                                                                                             Total Score [  8    ]                                                                                                                                                                                                                                                                                                                                                                                                                                           IMPROVE Bleeding Risk Score 2    Falls Risk:   High (x  )  Mod (  )  Low (  )      FAMILY HISTORY:  Family history of lung disease (Sibling)  Family history of heart valve insufficiency (Father)  Family history of COPD (chronic obstructive pulmonary disease) (Father)  Family history of diabetes mellitus in mother (Mother)  Family history of kidney cancer (Mother): mother    Denies any personal or familial history of clotting or bleeding disorders.    Allergies    Zantac (Other)    Intolerances        REVIEW OF SYSTEMS    (  )Fever	     (  )Constipation	(  )SOB				(  )Headache	(  )Dysuria  (  )Chills	     (  )Melena	(  )Dyspnea present on exertion	                    (  )Dizziness                    (  )Polyuria  (  )Nausea	     (  )Hematochezia	(  )Cough			                    (  )Syncope   	(  )Hematuria  (  )Vomiting    (  )Chest Pain	(  )Wheezing			(  )Weakness  (  )Diarrhea     (  )Palpitations	(  )Anorexia			( x )joint pain    All  other review of systems negative: Yes    Vital Signs Last 24 Hrs  T(C): 36.7 (19 @ 13:15), Max: 37.5 (19 @ 12:55)  T(F): 98.1 (19 @ 13:15), Max: 99.5 (19 @ 12:55)  HR: 75 (19 @ 13:15) (68 - 82)  BP: 125/70 (19 @ 13:15) (117/69 - 140/85)  BP(mean): 82 (19 @ 13:15) (82 - 82)  RR: 18 (19 @ 13:15) (10 - 19)  SpO2: 98% (19 @ 13:15) (95% - 100%)    PHYSICAL EXAM:    Constitutional: Appears Well    Neurological: A& O x 3    Skin: Warm    Respiratory and Thorax: normal effort; Breath sounds: normal; No rales/wheezing/rhonchi  	  Cardiovascular: S1, S2, regular, NMBR	    Gastrointestinal: BS + x 4Q, nontender	    Genitourinary:  Bladder nondistended, nontender    Musculoskeletal:   General Right:   + muscle/joint tenderness,   normal tone, no joint swelling,   ROM: limited	    General Left:   no muscle/joint tenderness,   normal tone, no joint swelling,   ROM: lfull    Knee:  Right: Incision: ; Dressing CDI;                 Lower extrems:   Right: no calf tenderness              negative robbin's sign               + pedal pulses    Left:   no calf tenderness              negative robbin's sign               + pedal pulses                          11.6   7.38  )-----------( 245      ( 21 Mar 2019 11:48 )             35.2       03-    143  |  108  |  24<H>  ----------------------------<  124<H>  3.6   |  29  |  0.96    Ca    9.0      21 Mar 2019 11:48        PT/INR - ( 21 Mar 2019 07:00 )   PT: 11.6 sec;   INR: 1.04 ratio         	MEDICATIONS  (STANDING):  ascorbic acid 500 milliGRAM(s) Oral two times a day  calcium carbonate 1250 mG  + Vitamin D (OsCal 500 + D) 1 Tablet(s) Oral three times a day  ceFAZolin   IVPB 2000 milliGRAM(s) IV Intermittent every 8 hours  clobetasol 0.05% Ointment 1 Application(s) Topical two times a day  docusate sodium 100 milliGRAM(s) Oral three times a day  ferrous    sulfate 325 milliGRAM(s) Oral three times a day with meals  folic acid 1 milliGRAM(s) Oral daily  hydrochlorothiazide 12.5 milliGRAM(s) Oral daily  lactated ringers. 1000 milliLiter(s) IV Continuous <Continuous>  losartan 50 milliGRAM(s) Oral daily  multivitamin 1 Tablet(s) Oral daily  pantoprazole    Tablet 40 milliGRAM(s) Oral before breakfast  PARoxetine 30 milliGRAM(s) Oral daily  polyethylene glycol 3350 17 Gram(s) Oral daily  rivaroxaban 10 milliGRAM(s) Oral daily  simvastatin 20 milliGRAM(s) Oral at bedtime          DVT Prophylaxis:  LMWH                   (  )  Heparin SQ           (  )  Coumadin             (  )  Xarelto                  ( x )  Eliquis                   (  )  Venodynes           (x  )  Ambulation          ( x )  UFH                       (  )  Contraindicated  (  )  EC ASPIRIN       (  )

## 2019-03-21 NOTE — PROGRESS NOTE ADULT - SUBJECTIVE AND OBJECTIVE BOX
Orthopedics Post-op Check    POD 0 Right* Total Knee Arthroplasty  Pain is controlled. Pt feeling well. some nausea no vomiting. Has been OOB with PT.    Vital Signs Last 24 Hrs  T(C): 36.3 (03-21-19 @ 17:09), Max: 37.5 (03-21-19 @ 12:55)  T(F): 97.3 (03-21-19 @ 17:09), Max: 99.5 (03-21-19 @ 12:55)  HR: 66 (03-21-19 @ 17:09) (66 - 82)  BP: 122/72 (03-21-19 @ 17:09) (117/69 - 140/85)  BP(mean): 82 (03-21-19 @ 13:15) (82 - 82)  RR: 18 (03-21-19 @ 17:09) (10 - 19)  SpO2: 95% (03-21-19 @ 17:09) (95% - 100%)                        11.6   7.38  )-----------( 245      ( 21 Mar 2019 11:48 )             35.2     21 Mar 2019 11:48    143    |  108    |  24     ----------------------------<  124    3.6     |  29     |  0.96     Ca    9.0        21 Mar 2019 11:48      PT/INR - ( 21 Mar 2019 07:00 )   PT: 11.6 sec;   INR: 1.04 ratio             Exam:  NAD AAOx3  Dressing clean and dry  +EHL FHL TA GS  SILT toes 1-5  +DP  Calf Soft NT    A/P:  Stable POD 0 Right Total Knee Arthroplasty  -Analgesia  -Ppx ABX  -DVT PE ppx  -OOB PT

## 2019-03-21 NOTE — CONSULT NOTE ADULT - ASSESSMENT
A;  67 yo female S/P right TKR with high VTE risk due to age, surgery , immobility and high BMI, Consulted by Dr Rodas for DVT/PE prophylaxis, risk stratification and Anticoagulation Management        P: D/W pt risks vs benefits and is agreeable to use Xarelto    Start Xarelto 10 mg po today and cont x 12 days  Protonix 40 mg po daily while on Xarelto  daily CBC, BMP  Venodynes BLE  INC Mobility as shiv    Thank you for the consult, will follow
Pt is a 67 y/o obese female with h/o HTN, hyperlipidemia, diet controlled type 2 diabetes, COPD, hyperlipidemia, sleep apnea, osteoarthritis who has been having increasing Rt knee pain.  Since she failed conservative measures, affecting her quality of life and ADLs she was admitted for elective Rt total knee replacement.  Postop medicine consult called for comanagement.      * Osteoarthritis-s/p Rt TKR, continue pain control, monitor post op labs, DVT proph. encouraged use of incentive spirometry.  * Nausea-? due to anesthesia ? GERD or combination of both, zofran prn  * GERD- IV protonix now, supportive care  * HTN-continue losartan but hold HCTZ in postop period  * Hyperlipidemia-statins  * Anxiety-paroxetine  * COPD-stable  * Proph- xarelto per anticoagulation team  * Disp-OOB, PT  * Comm- d/w pt in details, daughter, RN

## 2019-03-21 NOTE — PROGRESS NOTE ADULT - SUBJECTIVE AND OBJECTIVE BOX
Patient seen and examined. Pain controlled. No acute events, tolerated procedure well.      Vital Signs Last 24 Hrs  T(C): 36.3 (03-21-19 @ 17:09), Max: 37.5 (03-21-19 @ 12:55)  T(F): 97.3 (03-21-19 @ 17:09), Max: 99.5 (03-21-19 @ 12:55)  HR: 66 (03-21-19 @ 17:09) (66 - 82)  BP: 122/72 (03-21-19 @ 17:09) (117/69 - 140/85)  BP(mean): 82 (03-21-19 @ 13:15) (82 - 82)  RR: 18 (03-21-19 @ 17:09) (10 - 19)  SpO2: 95% (03-21-19 @ 17:09) (95% - 100%)      Physical Exam  Gen: NAD  RLE:   Dressing c/d/i  +ehl/fhl/ta/gs function  L2-S1 silt  Dp/pt pulse intact  No calf ttp  Compartments soft      A/P: 68y Female sp R TKA POD 0  Pain control  DVT ppx  PT/WBAT/OOB  Ice/elevate  Medical management appreciated  Incentive spirometry  Dispo planning

## 2019-03-22 ENCOUNTER — TRANSCRIPTION ENCOUNTER (OUTPATIENT)
Age: 69
End: 2019-03-22

## 2019-03-22 LAB
ANION GAP SERPL CALC-SCNC: 5 MMOL/L — SIGNIFICANT CHANGE UP (ref 5–17)
BUN SERPL-MCNC: 21 MG/DL — SIGNIFICANT CHANGE UP (ref 7–23)
CALCIUM SERPL-MCNC: 9.2 MG/DL — SIGNIFICANT CHANGE UP (ref 8.5–10.1)
CHLORIDE SERPL-SCNC: 105 MMOL/L — SIGNIFICANT CHANGE UP (ref 96–108)
CO2 SERPL-SCNC: 31 MMOL/L — SIGNIFICANT CHANGE UP (ref 22–31)
CREAT SERPL-MCNC: 1.2 MG/DL — SIGNIFICANT CHANGE UP (ref 0.5–1.3)
GLUCOSE SERPL-MCNC: 120 MG/DL — HIGH (ref 70–99)
HCT VFR BLD CALC: 34.2 % — LOW (ref 34.5–45)
HGB BLD-MCNC: 11.2 G/DL — LOW (ref 11.5–15.5)
MCHC RBC-ENTMCNC: 30.3 PG — SIGNIFICANT CHANGE UP (ref 27–34)
MCHC RBC-ENTMCNC: 32.7 GM/DL — SIGNIFICANT CHANGE UP (ref 32–36)
MCV RBC AUTO: 92.4 FL — SIGNIFICANT CHANGE UP (ref 80–100)
NRBC # BLD: 0 /100 WBCS — SIGNIFICANT CHANGE UP (ref 0–0)
PLATELET # BLD AUTO: 252 K/UL — SIGNIFICANT CHANGE UP (ref 150–400)
POTASSIUM SERPL-MCNC: 4.8 MMOL/L — SIGNIFICANT CHANGE UP (ref 3.5–5.3)
POTASSIUM SERPL-SCNC: 4.8 MMOL/L — SIGNIFICANT CHANGE UP (ref 3.5–5.3)
RBC # BLD: 3.7 M/UL — LOW (ref 3.8–5.2)
RBC # FLD: 13.1 % — SIGNIFICANT CHANGE UP (ref 10.3–14.5)
SODIUM SERPL-SCNC: 141 MMOL/L — SIGNIFICANT CHANGE UP (ref 135–145)
WBC # BLD: 10.61 K/UL — HIGH (ref 3.8–10.5)
WBC # FLD AUTO: 10.61 K/UL — HIGH (ref 3.8–10.5)

## 2019-03-22 PROCEDURE — 99232 SBSQ HOSP IP/OBS MODERATE 35: CPT

## 2019-03-22 RX ORDER — OXYCODONE HYDROCHLORIDE 5 MG/1
10 TABLET ORAL EVERY 4 HOURS
Qty: 0 | Refills: 0 | Status: DISCONTINUED | OUTPATIENT
Start: 2019-03-22 | End: 2019-03-24

## 2019-03-22 RX ORDER — PANTOPRAZOLE SODIUM 20 MG/1
1 TABLET, DELAYED RELEASE ORAL
Qty: 14 | Refills: 0 | OUTPATIENT
Start: 2019-03-22 | End: 2019-04-19

## 2019-03-22 RX ORDER — RIVAROXABAN 15 MG-20MG
1 KIT ORAL
Qty: 0 | Refills: 0 | COMMUNITY
Start: 2019-03-22

## 2019-03-22 RX ORDER — DOCUSATE SODIUM 100 MG
1 CAPSULE ORAL
Qty: 14 | Refills: 0 | OUTPATIENT
Start: 2019-03-22 | End: 2019-03-28

## 2019-03-22 RX ORDER — ASPIRIN/CALCIUM CARB/MAGNESIUM 324 MG
1 TABLET ORAL
Qty: 0 | Refills: 0 | COMMUNITY

## 2019-03-22 RX ORDER — HYDROMORPHONE HYDROCHLORIDE 2 MG/ML
1 INJECTION INTRAMUSCULAR; INTRAVENOUS; SUBCUTANEOUS EVERY 4 HOURS
Qty: 0 | Refills: 0 | Status: DISCONTINUED | OUTPATIENT
Start: 2019-03-22 | End: 2019-03-24

## 2019-03-22 RX ADMIN — Medication 1 TABLET(S): at 21:29

## 2019-03-22 RX ADMIN — Medication 1 TABLET(S): at 05:39

## 2019-03-22 RX ADMIN — OXYCODONE HYDROCHLORIDE 10 MILLIGRAM(S): 5 TABLET ORAL at 08:51

## 2019-03-22 RX ADMIN — Medication 1 APPLICATION(S): at 21:28

## 2019-03-22 RX ADMIN — Medication 650 MILLIGRAM(S): at 23:00

## 2019-03-22 RX ADMIN — Medication 325 MILLIGRAM(S): at 12:49

## 2019-03-22 RX ADMIN — HYDROMORPHONE HYDROCHLORIDE 1 MILLIGRAM(S): 2 INJECTION INTRAMUSCULAR; INTRAVENOUS; SUBCUTANEOUS at 16:15

## 2019-03-22 RX ADMIN — OXYCODONE HYDROCHLORIDE 5 MILLIGRAM(S): 5 TABLET ORAL at 03:20

## 2019-03-22 RX ADMIN — TRAMADOL HYDROCHLORIDE 50 MILLIGRAM(S): 50 TABLET ORAL at 10:55

## 2019-03-22 RX ADMIN — Medication 1 APPLICATION(S): at 05:39

## 2019-03-22 RX ADMIN — PANTOPRAZOLE SODIUM 40 MILLIGRAM(S): 20 TABLET, DELAYED RELEASE ORAL at 05:39

## 2019-03-22 RX ADMIN — Medication 650 MILLIGRAM(S): at 05:41

## 2019-03-22 RX ADMIN — Medication 325 MILLIGRAM(S): at 17:48

## 2019-03-22 RX ADMIN — LOSARTAN POTASSIUM 50 MILLIGRAM(S): 100 TABLET, FILM COATED ORAL at 05:39

## 2019-03-22 RX ADMIN — Medication 500 MILLIGRAM(S): at 17:48

## 2019-03-22 RX ADMIN — Medication 650 MILLIGRAM(S): at 05:40

## 2019-03-22 RX ADMIN — OXYCODONE HYDROCHLORIDE 10 MILLIGRAM(S): 5 TABLET ORAL at 09:50

## 2019-03-22 RX ADMIN — Medication 650 MILLIGRAM(S): at 12:51

## 2019-03-22 RX ADMIN — Medication 650 MILLIGRAM(S): at 17:48

## 2019-03-22 RX ADMIN — HYDROMORPHONE HYDROCHLORIDE 1 MILLIGRAM(S): 2 INJECTION INTRAMUSCULAR; INTRAVENOUS; SUBCUTANEOUS at 15:45

## 2019-03-22 RX ADMIN — Medication 650 MILLIGRAM(S): at 23:53

## 2019-03-22 RX ADMIN — OXYCODONE HYDROCHLORIDE 10 MILLIGRAM(S): 5 TABLET ORAL at 13:50

## 2019-03-22 RX ADMIN — Medication 100 MILLIGRAM(S): at 21:29

## 2019-03-22 RX ADMIN — Medication 1 TABLET(S): at 12:49

## 2019-03-22 RX ADMIN — TRAMADOL HYDROCHLORIDE 50 MILLIGRAM(S): 50 TABLET ORAL at 22:55

## 2019-03-22 RX ADMIN — SIMVASTATIN 20 MILLIGRAM(S): 20 TABLET, FILM COATED ORAL at 21:29

## 2019-03-22 RX ADMIN — Medication 1 MILLIGRAM(S): at 12:49

## 2019-03-22 RX ADMIN — RIVAROXABAN 10 MILLIGRAM(S): KIT at 12:49

## 2019-03-22 RX ADMIN — TRAMADOL HYDROCHLORIDE 50 MILLIGRAM(S): 50 TABLET ORAL at 11:55

## 2019-03-22 RX ADMIN — Medication 325 MILLIGRAM(S): at 08:51

## 2019-03-22 RX ADMIN — Medication 100 MILLIGRAM(S): at 15:01

## 2019-03-22 RX ADMIN — OXYCODONE HYDROCHLORIDE 10 MILLIGRAM(S): 5 TABLET ORAL at 12:51

## 2019-03-22 RX ADMIN — Medication 100 MILLIGRAM(S): at 01:17

## 2019-03-22 RX ADMIN — OXYCODONE HYDROCHLORIDE 5 MILLIGRAM(S): 5 TABLET ORAL at 02:29

## 2019-03-22 RX ADMIN — Medication 500 MILLIGRAM(S): at 05:39

## 2019-03-22 RX ADMIN — Medication 650 MILLIGRAM(S): at 12:48

## 2019-03-22 RX ADMIN — Medication 40 MILLIGRAM(S): at 21:29

## 2019-03-22 RX ADMIN — Medication 100 MILLIGRAM(S): at 05:39

## 2019-03-22 NOTE — PROGRESS NOTE ADULT - SUBJECTIVE AND OBJECTIVE BOX
Pt feels much better, no further nausea, no abdominal pain or heart burn.  C/o mild Rt knee pain, ambulating.      Date of Service: 03-22-19 @ 08:15    Vital Signs Last 24 Hrs  T(C): 36.8 (22 Mar 2019 05:30), Max: 37.5 (21 Mar 2019 12:55)  T(F): 98.2 (22 Mar 2019 05:30), Max: 99.5 (21 Mar 2019 12:55)  HR: 64 (22 Mar 2019 05:30) (63 - 82)  BP: 124/72 (22 Mar 2019 05:30) (104/66 - 135/56)  BP(mean): 82 (21 Mar 2019 13:15) (82 - 82)  RR: 18 (22 Mar 2019 05:30) (10 - 19)  SpO2: 99% (22 Mar 2019 05:30) (95% - 100%)    Daily     Daily     I&O's Detail    21 Mar 2019 07:01  -  22 Mar 2019 07:00  --------------------------------------------------------  IN:    lactated ringers.: 128 mL    lactated ringers.: 900 mL    Other: 1200 mL  Total IN: 2228 mL    OUT:  Total OUT: 0 mL    Total NET: 2228 mL          CAPILLARY BLOOD GLUCOSE                                          11.2   10.61 )-----------( 252      ( 22 Mar 2019 05:34 )             34.2       03-22    141  |  105  |  21  ----------------------------<  120<H>  4.8   |  31  |  1.20    Ca    9.2      22 Mar 2019 05:34        PT/INR - ( 21 Mar 2019 07:00 )   PT: 11.6 sec;   INR: 1.04 ratio                         MEDICATIONS  (STANDING):  acetaminophen   Tablet .. 650 milliGRAM(s) Oral every 6 hours  ascorbic acid 500 milliGRAM(s) Oral two times a day  calcium carbonate 1250 mG  + Vitamin D (OsCal 500 + D) 1 Tablet(s) Oral three times a day  clobetasol 0.05% Cream 1 Application(s) Topical two times a day  docusate sodium 100 milliGRAM(s) Oral three times a day  ferrous    sulfate 325 milliGRAM(s) Oral three times a day with meals  folic acid 1 milliGRAM(s) Oral daily  lactated ringers. 1000 milliLiter(s) (75 mL/Hr) IV Continuous <Continuous>  losartan 50 milliGRAM(s) Oral daily  multivitamin 1 Tablet(s) Oral daily  pantoprazole    Tablet 40 milliGRAM(s) Oral before breakfast  PARoxetine 40 milliGRAM(s) Oral at bedtime  polyethylene glycol 3350 17 Gram(s) Oral daily  rivaroxaban 10 milliGRAM(s) Oral daily  simvastatin 20 milliGRAM(s) Oral at bedtime    MEDICATIONS  (PRN):  acetaminophen   Tablet .. 650 milliGRAM(s) Oral every 6 hours PRN Temp greater or equal to 38C (100.4F), Mild Pain (1 - 3)  ALBUTerol    90 MICROgram(s) HFA Inhaler 2 Puff(s) Inhalation every 6 hours PRN Shortness of Breath and/or Wheezing  aluminum hydroxide/magnesium hydroxide/simethicone Suspension 30 milliLiter(s) Oral four times a day PRN Indigestion  benzocaine 15 mG/menthol 3.6 mG Lozenge 1 Lozenge Oral every 3 hours PRN Sore Throat  diphenhydrAMINE 25 milliGRAM(s) Oral every 4 hours PRN Rash and/or Itching  diphenhydrAMINE 25 milliGRAM(s) Oral at bedtime PRN Insomnia  magnesium hydroxide Suspension 30 milliLiter(s) Oral daily PRN Constipation  magnesium hydroxide Suspension 30 milliLiter(s) Oral four times a day PRN Constipation  ondansetron Injectable 4 milliGRAM(s) IV Push every 6 hours PRN Nausea and/or Vomiting  oxyCODONE    IR 5 milliGRAM(s) Oral every 4 hours PRN Moderate Pain (4 - 6)  oxyCODONE    IR 10 milliGRAM(s) Oral every 4 hours PRN Severe Pain (7 - 10)  senna 2 Tablet(s) Oral at bedtime PRN Constipation  traMADol 50 milliGRAM(s) Oral every 6 hours PRN Mild Pain (1 - 3)

## 2019-03-22 NOTE — DISCHARGE NOTE NURSING/CASE MANAGEMENT/SOCIAL WORK - NSDCDPATPORTLINK_GEN_ALL_CORE
You can access the [facility], offered by [facility], by registering with the following website: [email or web address]

## 2019-03-22 NOTE — PROGRESS NOTE ADULT - SUBJECTIVE AND OBJECTIVE BOX
HPI:  Patient is a 68y old  Female who presents with a chief complaint of inc right knee pain, h/o OA, Now S/P right TKR    Consulted by Dr. Rodas   for VTE prophylaxis, risk stratification, and anticoagulation management.    PAST MEDICAL & SURGICAL HISTORY:  Sleep apnea  Constipation  Vitamin D deficiency  Psoriasis  OA (osteoarthritis) of knee: right knee  Stuttering: on Paxil  Diabetes mellitus: metformin discontinued on 2019 - diet management  Proteinuria: h/o  COPD (chronic obstructive pulmonary disease): emphysema  HTN (hypertension)  Hyperlipidemia  Obesity  H/O cardiac catheterization: 2016  History of breast surgery: excision of breast hematoma  S/P carpal tunnel release    CrCl: 66.93    Caprini VTE Risk Score: CAPRINI SCORE  AGE RELATED RISK FACTORS                                                       MOBILITY RELATED FACTORS  [ ] Age 41-60 years                                            (1 Point)                  [ ] Bed rest                                                        (1 Point)  [ ] Age: 61-74 years                                           (2 Points)                [ ] Plaster cast                                                   (2 Points)  [ ] Age= 75 years                                              (3 Points)                 [ ] Bed bound for more than 72 hours                   (2 Points)    DISEASE RELATED RISK FACTORS                                               GENDER SPECIFIC FACTORS  [ ] Edema in the lower extremities                       (1 Point)           [ ] Pregnancy                                                            (1 Point)  [ ] Varicose veins                                               (1 Point)                  [ ] Post-partum < 6 weeks                                      (1 Point)             [ x] BMI > 25 Kg/m2                                            (1 Point)                  [ ] Hormonal therapy or oral contraception       (1 Point)                 [ ] Sepsis (in the previous month)                        (1 Point)             [ ] History of pregnancy complications                (1Point)  [ ] Pneumonia or serious lung disease                                             [ ] Unexplained or recurrent  (>/= 3), premature                                 (In the previous month)                               (1 Point)                birth with toxemia or growth-restricted infant (1 Point)  [ ] Abnormal pulmonary function test            (1 Point)                                   SURGERY RELATED RISK FACTORS  [ ] Acute myocardial infarction                       (1 Point)                  [ ]  Section                                         (1 Point)  [ ] Congestive heart failure (in the previous month) (1 Point)   [ ] Minor surgery   lasting <45 minutes       (1 Point)   [ ] Inflammatory bowel disease                             (1 Point)          [ ] Arthroscopic surgery                                  (2 Points)  [ ] Central venous access                                    (2 Points)            [ ] General surgery lasting >45 minutes      (2 Points)       [ ] Stroke (in the previous month)                  (5 Points)            [x ] Elective major lower extremity arthroplasty (5 Points)                                   [  ] Malignancy (present or past include skin melanoma                                          but exclude  basal skin cell)    (2 points)                                      TRAUMA RELATED RISK FACTORS                HEMATOLOGY RELATED FACTORS                                  [ ] Fracture of the hip, pelvis, or leg                       (5 Points)  [ ] Prior episodes of VTE                                     (3 Points)          [ ] Acute spinal cord injury (in the previous month)  (5 Points)  [ ] Positive family history for VTE                         (3 Points)       [ ] Paralysis (less than 1 month)                          (5 Points)  [ ] Prothrombin 72345 A                                      (3 Points)         [ ] Multiple Trauma (within 1month)                 (5Points)                                                                                                                                                                [ ] Factor V Leiden                                          (3 Points)                                OTHER RISK FACTORS                          [ ] Lupus anticoagulants                                     (3 Points)                       [ ] BMI > 40                          (1 Point)                                                         [ ] Anticardiolipin antibodies                                (3 Points)                   [ ] Smoking                              (1Point)                                                [ ] High homocysteine in the blood                      (3 Points)                [  ] Diabetes requiring insulin (1point)                         [ ] Other congenital or acquired thrombophilia       (3 Points)          [  ] Chemotherapy                   (1 Point)  [ ] Heparin induced thrombocytopenia                  (3 Points)             [  ] Blood Transfusion                (1 point)                                                                                                             Total Score [  8    ]                                                                                                                                                                                                                                                                                                                                                                                                                IMPROVE Bleeding Risk Score 2    Falls Risk:   High (x  )  Mod (  )  Low (  )      FAMILY HISTORY:  Family history of lung disease (Sibling)  Family history of heart valve insufficiency (Father)  Family history of COPD (chronic obstructive pulmonary disease) (Father)  Family history of diabetes mellitus in mother (Mother)  Family history of kidney cancer (Mother): mother    Denies any personal or familial history of clotting or bleeding disorders.    Allergies    Zantac (Other)    Intolerances    REVIEW OF SYSTEMS    (  )Fever	     (  )Constipation	(  )SOB				(  )Headache	(  )Dysuria  (  )Chills	     (  )Melena	(  )Dyspnea present on exertion	                    (  )Dizziness                    (  )Polyuria  (  )Nausea	     (  )Hematochezia	(  )Cough			                    (  )Syncope   	(  )Hematuria  (  )Vomiting    (  )Chest Pain	(  )Wheezing			(  )Weakness  (  )Diarrhea     (  )Palpitations	(  )Anorexia			( x )joint pain    All  other review of systems negative: Yes    PHYSICAL EXAM:    Constitutional: Appears Well    Neurological: A& O x 3    Skin: Warm    Respiratory and Thorax: normal effort; Breath sounds: decreased bilaterally  	  Cardiovascular: S1, S2, regular, NMBR	    Gastrointestinal: BS + x 4Q, nontender	    Genitourinary:  Bladder nondistended, nontender    Musculoskeletal:   General Right:   + muscle/joint tenderness,   normal tone, no joint swelling,   ROM: limited	    General Left:   no muscle/joint tenderness,   normal tone, no joint swelling,   ROM: lfull    Knee:  Right: Incision: ; Dressing CDI;                 Lower extrems:   Right: no calf tenderness              negative robbin's sign               + pedal pulses    Left:   no calf tenderness              negative robbin's sign               + pedal pulses                          11.2   10.61 )-----------( 252      ( 22 Mar 2019 05:34 )             34.2       03-    141  |  105  |  21  ----------------------------<  120<H>  4.8   |  31  |  1.20    Ca    9.2      22 Mar 2019 05:34        PT/INR - ( 21 Mar 2019 07:00 )   PT: 11.6 sec;   INR: 1.04 ratio                                   11.6   7.38  )-----------( 245      ( 21 Mar 2019 11:48 )             35.2       03-21    143  |  108  |  24<H>  ----------------------------<  124<H>  3.6   |  29  |  0.96    Ca    9.0      21 Mar 2019 11:48        PT/INR - ( 21 Mar 2019 07:00 )   PT: 11.6 sec;   INR: 1.04 ratio         	MEDICATIONS  (STANDING):  ascorbic acid 500 milliGRAM(s) Oral two times a day  calcium carbonate 1250 mG  + Vitamin D (OsCal 500 + D) 1 Tablet(s) Oral three times a day  ceFAZolin   IVPB 2000 milliGRAM(s) IV Intermittent every 8 hours  clobetasol 0.05% Ointment 1 Application(s) Topical two times a day  docusate sodium 100 milliGRAM(s) Oral three times a day  ferrous    sulfate 325 milliGRAM(s) Oral three times a day with meals  folic acid 1 milliGRAM(s) Oral daily  hydrochlorothiazide 12.5 milliGRAM(s) Oral daily  lactated ringers. 1000 milliLiter(s) IV Continuous <Continuous>  losartan 50 milliGRAM(s) Oral daily  multivitamin 1 Tablet(s) Oral daily  pantoprazole    Tablet 40 milliGRAM(s) Oral before breakfast  PARoxetine 30 milliGRAM(s) Oral daily  polyethylene glycol 3350 17 Gram(s) Oral daily  rivaroxaban 10 milliGRAM(s) Oral daily  simvastatin 20 milliGRAM(s) Oral at bedtime      DVT Prophylaxis:  LMWH                   (  )  Heparin SQ           (  )  Coumadin             (  )  Xarelto                  ( x )  Eliquis                   (  )  Venodynes           (x  )  Ambulation          ( x )  UFH                       (  )  Contraindicated  (  )  EC ASPIRIN       (  ) HPI:  Patient is a 68y old  Female who presents with a chief complaint of inc right knee pain, h/o OA, Now S/P right TKR    Consulted by Dr. Rodas   for VTE prophylaxis, risk stratification, and anticoagulation management.    PAST MEDICAL & SURGICAL HISTORY:  Sleep apnea  Constipation  Vitamin D deficiency  Psoriasis  OA (osteoarthritis) of knee: right knee  Stuttering: on Paxil  Diabetes mellitus: metformin discontinued on 2019 - diet management  Proteinuria: h/o  COPD (chronic obstructive pulmonary disease): emphysema  HTN (hypertension)  Hyperlipidemia  Obesity  H/O cardiac catheterization: 2016  History of breast surgery: excision of breast hematoma  S/P carpal tunnel release    CrCl: 66.93    Caprini VTE Risk Score: CAPRINI SCORE  AGE RELATED RISK FACTORS                                                       MOBILITY RELATED FACTORS  [ ] Age 41-60 years                                            (1 Point)                  [ ] Bed rest                                                        (1 Point)  [ ] Age: 61-74 years                                           (2 Points)                [ ] Plaster cast                                                   (2 Points)  [ ] Age= 75 years                                              (3 Points)                 [ ] Bed bound for more than 72 hours                   (2 Points)    DISEASE RELATED RISK FACTORS                                               GENDER SPECIFIC FACTORS  [ ] Edema in the lower extremities                       (1 Point)           [ ] Pregnancy                                                            (1 Point)  [ ] Varicose veins                                               (1 Point)                  [ ] Post-partum < 6 weeks                                      (1 Point)             [ x] BMI > 25 Kg/m2                                            (1 Point)                  [ ] Hormonal therapy or oral contraception       (1 Point)                 [ ] Sepsis (in the previous month)                        (1 Point)             [ ] History of pregnancy complications                (1Point)  [ ] Pneumonia or serious lung disease                                             [ ] Unexplained or recurrent  (>/= 3), premature                                 (In the previous month)                               (1 Point)                birth with toxemia or growth-restricted infant (1 Point)  [ ] Abnormal pulmonary function test            (1 Point)                                   SURGERY RELATED RISK FACTORS  [ ] Acute myocardial infarction                       (1 Point)                  [ ]  Section                                         (1 Point)  [ ] Congestive heart failure (in the previous month) (1 Point)   [ ] Minor surgery   lasting <45 minutes       (1 Point)   [ ] Inflammatory bowel disease                             (1 Point)          [ ] Arthroscopic surgery                                  (2 Points)  [ ] Central venous access                                    (2 Points)            [ ] General surgery lasting >45 minutes      (2 Points)       [ ] Stroke (in the previous month)                  (5 Points)            [x ] Elective major lower extremity arthroplasty (5 Points)                                   [  ] Malignancy (present or past include skin melanoma                                          but exclude  basal skin cell)    (2 points)                                      TRAUMA RELATED RISK FACTORS                HEMATOLOGY RELATED FACTORS                                  [ ] Fracture of the hip, pelvis, or leg                       (5 Points)  [ ] Prior episodes of VTE                                     (3 Points)          [ ] Acute spinal cord injury (in the previous month)  (5 Points)  [ ] Positive family history for VTE                         (3 Points)       [ ] Paralysis (less than 1 month)                          (5 Points)  [ ] Prothrombin 61666 A                                      (3 Points)         [ ] Multiple Trauma (within 1month)                 (5Points)                                                                                                                                                                [ ] Factor V Leiden                                          (3 Points)                                OTHER RISK FACTORS                          [ ] Lupus anticoagulants                                     (3 Points)                       [ ] BMI > 40                          (1 Point)                                                         [ ] Anticardiolipin antibodies                                (3 Points)                   [ ] Smoking                              (1Point)                                                [ ] High homocysteine in the blood                      (3 Points)                [  ] Diabetes requiring insulin (1point)                         [ ] Other congenital or acquired thrombophilia       (3 Points)          [  ] Chemotherapy                   (1 Point)  [ ] Heparin induced thrombocytopenia                  (3 Points)             [  ] Blood Transfusion                (1 point)                                                                                                             Total Score [  8    ]                                                                                                                                                                                                                      IMPROVE Bleeding Risk Score 2    Falls Risk:   High (x  )  Mod (  )  Low (  )    3/22: Patient and daughter seen at bedside, OOB to chair. Both are RN's. Patient verbalized understanding of necessity for Xarelto for total of 12 days. She is opting for rehab due to multiple dosga t home and increased fall risk and increased risk for injury.    FAMILY HISTORY:  Family history of lung disease (Sibling)  Family history of heart valve insufficiency (Father)  Family history of COPD (chronic obstructive pulmonary disease) (Father)  Family history of diabetes mellitus in mother (Mother)  Family history of kidney cancer (Mother): mother    Denies any personal or familial history of clotting or bleeding disorders.    Allergies    Zantac (Other)    Intolerances    REVIEW OF SYSTEMS    (  )Fever	     (  )Constipation	(  )SOB				(  )Headache	(  )Dysuria  (  )Chills	     (  )Melena	(  )Dyspnea present on exertion	                    (  )Dizziness                    (  )Polyuria  (  )Nausea	     (  )Hematochezia	(  )Cough			                    (  )Syncope   	(  )Hematuria  (  )Vomiting    (  )Chest Pain	(  )Wheezing			(  )Weakness  (  )Diarrhea     (  )Palpitations	(  )Anorexia			( x )Joint pain    All  other review of systems negative: Yes    PHYSICAL EXAM:    Constitutional: Appears Well    Neurological: A& O x 3    Skin: Warm    Respiratory and Thorax: normal effort; Breath sounds: decreased bilaterally  	  Cardiovascular: S1, S2, regular, NMBR	    Gastrointestinal: BS + x 4Q, nontender	    Genitourinary:  Bladder nondistended, nontender    Musculoskeletal:   General Right:   + muscle/joint tenderness,   normal tone, no joint swelling,   ROM: limited	    General Left:   no muscle/joint tenderness,   normal tone, no joint swelling,   ROM: lfull    Knee:  Right: Incision: ; Dressing CDI;                 Lower extrems:   Right: no calf tenderness              negative robbin's sign               + pedal pulses    Left:   no calf tenderness              negative robbin's sign               + pedal pulses    Vital Signs Last 24 Hrs  T(C): 36.9 (19 @ 10:48), Max: 37.5 (19 @ 12:55)  T(F): 98.5 (19 @ 10:48), Max: 99.5 (19 @ 12:55)  HR: 68 (19 @ 10:48) (63 - 82)  BP: 109/57 (19 @ 10:48) (104/66 - 135/56)  BP(mean): 82 (19 @ 13:15) (82 - 82)  RR: 18 (19 @ 10:48) (12 - 18)  SpO2: 97% (19 @ 10:48) (95% - 100%)                          11.2   10.61 )-----------( 252      ( 22 Mar 2019 05:34 )             34.2           141  |  105  |  21  ----------------------------<  120<H>  4.8   |  31  |  1.20    Ca    9.2      22 Mar 2019 05:34        PT/INR - ( 21 Mar 2019 07:00 )   PT: 11.6 sec;   INR: 1.04 ratio                                   11.6   7.38  )-----------( 245      ( 21 Mar 2019 11:48 )             35.2       -    143  |  108  |  24<H>  ----------------------------<  124<H>  3.6   |  29  |  0.96    Ca    9.0      21 Mar 2019 11:48        PT/INR - ( 21 Mar 2019 07:00 )   PT: 11.6 sec;   INR: 1.04 ratio         	MEDICATIONS  (STANDING):  ascorbic acid 500 milliGRAM(s) Oral two times a day  calcium carbonate 1250 mG  + Vitamin D (OsCal 500 + D) 1 Tablet(s) Oral three times a day  ceFAZolin   IVPB 2000 milliGRAM(s) IV Intermittent every 8 hours  clobetasol 0.05% Ointment 1 Application(s) Topical two times a day  docusate sodium 100 milliGRAM(s) Oral three times a day  ferrous    sulfate 325 milliGRAM(s) Oral three times a day with meals  folic acid 1 milliGRAM(s) Oral daily  hydrochlorothiazide 12.5 milliGRAM(s) Oral daily  lactated ringers. 1000 milliLiter(s) IV Continuous <Continuous>  losartan 50 milliGRAM(s) Oral daily  multivitamin 1 Tablet(s) Oral daily  pantoprazole    Tablet 40 milliGRAM(s) Oral before breakfast  PARoxetine 30 milliGRAM(s) Oral daily  polyethylene glycol 3350 17 Gram(s) Oral daily  rivaroxaban 10 milliGRAM(s) Oral daily  simvastatin 20 milliGRAM(s) Oral at bedtime        DVT Prophylaxis:  LMWH                   (  )  Heparin SQ           (  )  Coumadin             (  )  Xarelto                  ( x )  Eliquis                   (  )  Venodynes           (x  )  Ambulation          ( x )  UFH                       (  )  Contraindicated  (  )  EC ASPIRIN       (  )

## 2019-03-22 NOTE — DISCHARGE NOTE PROVIDER - HOSPITAL COURSE
H&P:    Pt is a68y Female PAST MEDICAL & SURGICAL HISTORY:    Sleep apnea    Constipation    Vitamin D deficiency    Psoriasis    OA (osteoarthritis) of knee: right knee    Stuttering: on Paxil    Diabetes mellitus: metformin discontinued on 01/2019 - diet management    Proteinuria: h/o    COPD (chronic obstructive pulmonary disease): emphysema    HTN (hypertension)    Hyperlipidemia    Obesity    H/O cardiac catheterization: 2016    History of breast surgery: excision of breast hematoma    S/P carpal tunnel release             Now s/p Total Knee Arthroplasty. Pt is afebrile with stable vital signs. Pain is controlled. Alert and Oriented. Exam reveals intact EHL FHL TA GS, +DP. Dressing is clean and dry with a new bandage on.        Hospital Course:    Patient presented to [facility] medically cleared for elective Knee Replacement Surgery, having failed outpatient conservative management. Prophylactic antibiotics were started before the procedure and continued for 24 hours. They were admitted after surgery to the orthopedic floor.   There were no complications during the hospital stay. All home medications were continued.         Routine consults were obtained from the Anticoagulation Team for DVT/PE prophylaxis, from Physical Therapy for twice daily PT, and from the Hospitalist for Medical Co-management. Patient was placed on XARELTO anticoagulation.  Pertinent home medications were continued.  Daily labs were followed.          On POD 0 pt was stable overnight. No major event POD1-2. Pt received twice daily PT and a new dressing was applied prior to discharge. The plan is for DC to home with home PT.  The orthopedic Attending is aware and agrees. H&P:    Pt is a68y Female PAST MEDICAL & SURGICAL HISTORY:    Sleep apnea    Constipation    Vitamin D deficiency    Psoriasis    OA (osteoarthritis) of knee: right knee    Stuttering: on Paxil    Diabetes mellitus: metformin discontinued on 01/2019 - diet management    Proteinuria: h/o    COPD (chronic obstructive pulmonary disease): emphysema    HTN (hypertension)    Hyperlipidemia    Obesity    H/O cardiac catheterization: 2016    History of breast surgery: excision of breast hematoma    S/P carpal tunnel release             Now s/p Total Knee Arthroplasty. Pt is afebrile with stable vital signs. Pain is controlled. Alert and Oriented. Exam reveals intact EHL FHL TA GS, +DP. Dressing is clean and dry with a new bandage on.        Hospital Course:    Patient presented to [facility] medically cleared for elective Knee Replacement Surgery, having failed outpatient conservative management. Prophylactic antibiotics were started before the procedure and continued for 24 hours. They were admitted after surgery to the orthopedic floor.   There were no complications during the hospital stay. All home medications were continued.         Routine consults were obtained from the Anticoagulation Team for DVT/PE prophylaxis, from Physical Therapy for twice daily PT, and from the Hospitalist for Medical Co-management. Patient was placed on XARELTO anticoagulation.  Pertinent home medications were continued.  Daily labs were followed.          On POD 0 pt was stable overnight. No major event POD1-3. Pt received twice daily PT and a new dressing was applied prior to discharge. The plan is for DC to [facility].  The orthopedic Attending is aware and agrees.

## 2019-03-22 NOTE — PROGRESS NOTE ADULT - SUBJECTIVE AND OBJECTIVE BOX
Pt S/E at bedside, no acute events overnight, pain controlled. No complaints this morning, Pt feels well.    Vital Signs Last 24 Hrs  T(C): 36.8 (22 Mar 2019 04:10), Max: 37.5 (21 Mar 2019 12:55)  T(F): 98.2 (22 Mar 2019 04:10), Max: 99.5 (21 Mar 2019 12:55)  HR: 63 (22 Mar 2019 04:10) (63 - 82)  BP: 104/66 (22 Mar 2019 04:10) (104/66 - 140/85)  BP(mean): 82 (21 Mar 2019 13:15) (82 - 82)  RR: 18 (22 Mar 2019 04:10) (10 - 19)  SpO2: 96% (22 Mar 2019 04:10) (95% - 100%)    Gen: NAD, AAOx3    Right Lower Extremity:  Dressing clean dry intact  +EHL/FHL/TA/GS  SILT L3-S1  +DP/PT Pulses  Compartments soft  No calf TTP B/L

## 2019-03-22 NOTE — DISCHARGE NOTE PROVIDER - NSDCFUADDINST_GEN_ALL_CORE_FT
Discharge Instructions for Total Knee Arthroplasty    1.  Diet: Resume previous diet  2. Activity: WBAT, Rolling walker, Daily PT. Gentle ROM 0-full as tolerated. Walk plenty.  Keep a bump (rolled towel or blanket) under your heel to keep leg straight while in bed or chair for 2 weeks.  3. Call with: fever over 101, wound redness, drainage or open area, calf pain/calf swelling  4. Wound Care: Remove old and place new Aquacel  bandage to Knee in 7 days. RN can Remove Sutures Post Op Day #14 (4/4/19) so long as wound is healed, no drainage or open area. OK to Shower with Aquacel; Must be and Aquacel bandage to shower.  Avoid direct water beating on bandage.  Continue ICE packs to knee. No bandage needed after suture removal.  5. DVT PE Prophylaxis: XARELTO Managed by Anticoag Team.  6. Continue Protonix daily while on Anticoagulant. an eRx has been sent to your pharmacy.  7. Labs: Check H&H weekly while on Anticoagulation.   8. Follow Up: Dr. Rodas 2 weeks in office. Call to schedule.  9. Pain medications:  If going home, eRX sent to your pharmacy for .   10.***Please Call the office if any of you medications are not covered under your insurance , especially if it is a BLOOD CLOT PREVENTION/anticoagulant medication.

## 2019-03-23 LAB
ANION GAP SERPL CALC-SCNC: 6 MMOL/L — SIGNIFICANT CHANGE UP (ref 5–17)
BUN SERPL-MCNC: 23 MG/DL — SIGNIFICANT CHANGE UP (ref 7–23)
CALCIUM SERPL-MCNC: 9.1 MG/DL — SIGNIFICANT CHANGE UP (ref 8.5–10.1)
CHLORIDE SERPL-SCNC: 108 MMOL/L — SIGNIFICANT CHANGE UP (ref 96–108)
CO2 SERPL-SCNC: 29 MMOL/L — SIGNIFICANT CHANGE UP (ref 22–31)
CREAT SERPL-MCNC: 1.01 MG/DL — SIGNIFICANT CHANGE UP (ref 0.5–1.3)
GLUCOSE SERPL-MCNC: 99 MG/DL — SIGNIFICANT CHANGE UP (ref 70–99)
HCT VFR BLD CALC: 33.3 % — LOW (ref 34.5–45)
HGB BLD-MCNC: 10.7 G/DL — LOW (ref 11.5–15.5)
MCHC RBC-ENTMCNC: 30.1 PG — SIGNIFICANT CHANGE UP (ref 27–34)
MCHC RBC-ENTMCNC: 32.1 GM/DL — SIGNIFICANT CHANGE UP (ref 32–36)
MCV RBC AUTO: 93.5 FL — SIGNIFICANT CHANGE UP (ref 80–100)
NRBC # BLD: 0 /100 WBCS — SIGNIFICANT CHANGE UP (ref 0–0)
PLATELET # BLD AUTO: 233 K/UL — SIGNIFICANT CHANGE UP (ref 150–400)
POTASSIUM SERPL-MCNC: 4.2 MMOL/L — SIGNIFICANT CHANGE UP (ref 3.5–5.3)
POTASSIUM SERPL-SCNC: 4.2 MMOL/L — SIGNIFICANT CHANGE UP (ref 3.5–5.3)
RBC # BLD: 3.56 M/UL — LOW (ref 3.8–5.2)
RBC # FLD: 13.3 % — SIGNIFICANT CHANGE UP (ref 10.3–14.5)
SODIUM SERPL-SCNC: 143 MMOL/L — SIGNIFICANT CHANGE UP (ref 135–145)
WBC # BLD: 8.41 K/UL — SIGNIFICANT CHANGE UP (ref 3.8–10.5)
WBC # FLD AUTO: 8.41 K/UL — SIGNIFICANT CHANGE UP (ref 3.8–10.5)

## 2019-03-23 PROCEDURE — 99232 SBSQ HOSP IP/OBS MODERATE 35: CPT

## 2019-03-23 RX ORDER — ALBUTEROL 90 UG/1
2 AEROSOL, METERED ORAL
Qty: 0 | Refills: 0 | COMMUNITY

## 2019-03-23 RX ORDER — ERGOCALCIFEROL 1.25 MG/1
1 CAPSULE ORAL
Qty: 0 | Refills: 0 | COMMUNITY

## 2019-03-23 RX ORDER — SIMVASTATIN 20 MG/1
1 TABLET, FILM COATED ORAL
Qty: 0 | Refills: 0 | COMMUNITY

## 2019-03-23 RX ORDER — FENOFIBRATE,MICRONIZED 130 MG
1 CAPSULE ORAL
Qty: 0 | Refills: 0 | COMMUNITY

## 2019-03-23 RX ORDER — LOSARTAN/HYDROCHLOROTHIAZIDE 100MG-25MG
1 TABLET ORAL
Qty: 0 | Refills: 0 | COMMUNITY

## 2019-03-23 RX ORDER — SENNOSIDES/DOCUSATE SODIUM 8.6MG-50MG
2 TABLET ORAL
Qty: 0 | Refills: 0 | COMMUNITY

## 2019-03-23 RX ADMIN — OXYCODONE HYDROCHLORIDE 10 MILLIGRAM(S): 5 TABLET ORAL at 14:44

## 2019-03-23 RX ADMIN — Medication 325 MILLIGRAM(S): at 11:35

## 2019-03-23 RX ADMIN — Medication 500 MILLIGRAM(S): at 17:14

## 2019-03-23 RX ADMIN — OXYCODONE HYDROCHLORIDE 10 MILLIGRAM(S): 5 TABLET ORAL at 15:15

## 2019-03-23 RX ADMIN — POLYETHYLENE GLYCOL 3350 17 GRAM(S): 17 POWDER, FOR SOLUTION ORAL at 11:35

## 2019-03-23 RX ADMIN — OXYCODONE HYDROCHLORIDE 10 MILLIGRAM(S): 5 TABLET ORAL at 08:15

## 2019-03-23 RX ADMIN — Medication 1 MILLIGRAM(S): at 10:01

## 2019-03-23 RX ADMIN — SIMVASTATIN 20 MILLIGRAM(S): 20 TABLET, FILM COATED ORAL at 22:16

## 2019-03-23 RX ADMIN — Medication 650 MILLIGRAM(S): at 05:57

## 2019-03-23 RX ADMIN — Medication 325 MILLIGRAM(S): at 17:14

## 2019-03-23 RX ADMIN — OXYCODONE HYDROCHLORIDE 10 MILLIGRAM(S): 5 TABLET ORAL at 07:44

## 2019-03-23 RX ADMIN — Medication 1 APPLICATION(S): at 07:45

## 2019-03-23 RX ADMIN — Medication 40 MILLIGRAM(S): at 22:15

## 2019-03-23 RX ADMIN — Medication 650 MILLIGRAM(S): at 17:15

## 2019-03-23 RX ADMIN — MAGNESIUM HYDROXIDE 30 MILLILITER(S): 400 TABLET, CHEWABLE ORAL at 07:44

## 2019-03-23 RX ADMIN — PANTOPRAZOLE SODIUM 40 MILLIGRAM(S): 20 TABLET, DELAYED RELEASE ORAL at 05:57

## 2019-03-23 RX ADMIN — Medication 1 TABLET(S): at 07:43

## 2019-03-23 RX ADMIN — Medication 1 APPLICATION(S): at 22:16

## 2019-03-23 RX ADMIN — TRAMADOL HYDROCHLORIDE 50 MILLIGRAM(S): 50 TABLET ORAL at 22:40

## 2019-03-23 RX ADMIN — Medication 1 TABLET(S): at 22:15

## 2019-03-23 RX ADMIN — TRAMADOL HYDROCHLORIDE 50 MILLIGRAM(S): 50 TABLET ORAL at 10:01

## 2019-03-23 RX ADMIN — Medication 100 MILLIGRAM(S): at 22:15

## 2019-03-23 RX ADMIN — Medication 1 TABLET(S): at 05:56

## 2019-03-23 RX ADMIN — Medication 100 MILLIGRAM(S): at 06:00

## 2019-03-23 RX ADMIN — RIVAROXABAN 10 MILLIGRAM(S): KIT at 11:35

## 2019-03-23 RX ADMIN — MAGNESIUM HYDROXIDE 30 MILLILITER(S): 400 TABLET, CHEWABLE ORAL at 17:17

## 2019-03-23 RX ADMIN — Medication 325 MILLIGRAM(S): at 07:43

## 2019-03-23 RX ADMIN — TRAMADOL HYDROCHLORIDE 50 MILLIGRAM(S): 50 TABLET ORAL at 10:30

## 2019-03-23 RX ADMIN — Medication 650 MILLIGRAM(S): at 17:38

## 2019-03-23 RX ADMIN — Medication 100 MILLIGRAM(S): at 14:44

## 2019-03-23 RX ADMIN — Medication 650 MILLIGRAM(S): at 05:56

## 2019-03-23 RX ADMIN — LOSARTAN POTASSIUM 50 MILLIGRAM(S): 100 TABLET, FILM COATED ORAL at 05:58

## 2019-03-23 RX ADMIN — Medication 650 MILLIGRAM(S): at 11:35

## 2019-03-23 RX ADMIN — Medication 500 MILLIGRAM(S): at 05:56

## 2019-03-23 RX ADMIN — TRAMADOL HYDROCHLORIDE 50 MILLIGRAM(S): 50 TABLET ORAL at 22:14

## 2019-03-23 RX ADMIN — Medication 650 MILLIGRAM(S): at 11:36

## 2019-03-23 NOTE — PROGRESS NOTE ADULT - SUBJECTIVE AND OBJECTIVE BOX
Orthopedics    POD 2 Right Total Knee Arthroplasty  Pain is controlled. Pt feeling well. No nausea or vomiting. Has been OOB with PT.    Vital Signs Last 24 Hrs  T(C): 37.1 (03-23-19 @ 05:12), Max: 37.1 (03-23-19 @ 05:12)  T(F): 98.7 (03-23-19 @ 05:12), Max: 98.7 (03-23-19 @ 05:12)  HR: 64 (03-23-19 @ 05:12) (64 - 71)  BP: 131/71 (03-23-19 @ 05:12) (109/57 - 131/71)  BP(mean): --  RR: 18 (03-23-19 @ 05:12) (18 - 18)  SpO2: 93% (03-23-19 @ 05:12) (93% - 97%)                        10.7   8.41  )-----------( 233      ( 23 Mar 2019 06:20 )             33.3     23 Mar 2019 06:20    143    |  108    |  23     ----------------------------<  99     4.2     |  29     |  1.01     Ca    9.1        23 Mar 2019 06:20        Exam:  NAD AAOx3  Dressing clean and dry  +EHL FHL TA GS  SILT toes 1-5  +DP  Calf Soft NT    A/P:  Stable POD 2 Right Total Knee Arthroplasty  -Analgesia  -DVT PE ppx  -OOB PT

## 2019-03-23 NOTE — PROGRESS NOTE ADULT - SUBJECTIVE AND OBJECTIVE BOX
Pt S/E at bedside, no acute events overnight, pain controlled. No complaints this morning,     Vital Signs Last 24 Hrs  T(C): 37.1 (23 Mar 2019 05:12), Max: 37.1 (23 Mar 2019 05:12)  T(F): 98.7 (23 Mar 2019 05:12), Max: 98.7 (23 Mar 2019 05:12)  HR: 64 (23 Mar 2019 05:12) (64 - 71)  BP: 131/71 (23 Mar 2019 05:12) (109/57 - 131/71)  BP(mean): --  RR: 18 (23 Mar 2019 05:12) (18 - 18)  SpO2: 93% (23 Mar 2019 05:12) (93% - 97%)    Gen: NAD, AAOx3    Right Lower Extremity:  Dressing clean dry intact  +EHL/FHL/TA/GS  SILT L3-S1  +DP/PT Pulses  Compartments soft  No calf TTP B/L

## 2019-03-23 NOTE — PROGRESS NOTE ADULT - SUBJECTIVE AND OBJECTIVE BOX
HPI:  Patient is a 68y old  Female who presents with a chief complaint of inc right knee pain, h/o OA, Now S/P right TKR    Consulted by Dr. Rodas   for VTE prophylaxis, risk stratification, and anticoagulation management.    PAST MEDICAL & SURGICAL HISTORY:  Sleep apnea  Constipation  Vitamin D deficiency  Psoriasis  OA (osteoarthritis) of knee: right knee  Stuttering: on Paxil  Diabetes mellitus: metformin discontinued on 2019 - diet management  Proteinuria: h/o  COPD (chronic obstructive pulmonary disease): emphysema  HTN (hypertension)  Hyperlipidemia  Obesity  H/O cardiac catheterization: 2016  History of breast surgery: excision of breast hematoma  S/P carpal tunnel release    CrCl: 66.93    Caprini VTE Risk Score: CAPRINI SCORE  AGE RELATED RISK FACTORS                                                       MOBILITY RELATED FACTORS  [ ] Age 41-60 years                                            (1 Point)                  [ ] Bed rest                                                        (1 Point)  [ ] Age: 61-74 years                                           (2 Points)                [ ] Plaster cast                                                   (2 Points)  [ ] Age= 75 years                                              (3 Points)                 [ ] Bed bound for more than 72 hours                   (2 Points)    DISEASE RELATED RISK FACTORS                                               GENDER SPECIFIC FACTORS  [ ] Edema in the lower extremities                       (1 Point)           [ ] Pregnancy                                                            (1 Point)  [ ] Varicose veins                                               (1 Point)                  [ ] Post-partum < 6 weeks                                      (1 Point)             [ x] BMI > 25 Kg/m2                                            (1 Point)                  [ ] Hormonal therapy or oral contraception       (1 Point)                 [ ] Sepsis (in the previous month)                        (1 Point)             [ ] History of pregnancy complications                (1Point)  [ ] Pneumonia or serious lung disease                                             [ ] Unexplained or recurrent  (>/= 3), premature                                 (In the previous month)                               (1 Point)                birth with toxemia or growth-restricted infant (1 Point)  [ ] Abnormal pulmonary function test            (1 Point)                                   SURGERY RELATED RISK FACTORS  [ ] Acute myocardial infarction                       (1 Point)                  [ ]  Section                                         (1 Point)  [ ] Congestive heart failure (in the previous month) (1 Point)   [ ] Minor surgery   lasting <45 minutes       (1 Point)   [ ] Inflammatory bowel disease                             (1 Point)          [ ] Arthroscopic surgery                                  (2 Points)  [ ] Central venous access                                    (2 Points)            [ ] General surgery lasting >45 minutes      (2 Points)       [ ] Stroke (in the previous month)                  (5 Points)            [x ] Elective major lower extremity arthroplasty (5 Points)                                   [  ] Malignancy (present or past include skin melanoma                                          but exclude  basal skin cell)    (2 points)                                      TRAUMA RELATED RISK FACTORS                HEMATOLOGY RELATED FACTORS                                  [ ] Fracture of the hip, pelvis, or leg                       (5 Points)  [ ] Prior episodes of VTE                                     (3 Points)          [ ] Acute spinal cord injury (in the previous month)  (5 Points)  [ ] Positive family history for VTE                         (3 Points)       [ ] Paralysis (less than 1 month)                          (5 Points)  [ ] Prothrombin 50496 A                                      (3 Points)         [ ] Multiple Trauma (within 1month)                 (5Points)                                                                                                                                                                [ ] Factor V Leiden                                          (3 Points)                                OTHER RISK FACTORS                          [ ] Lupus anticoagulants                                     (3 Points)                       [ ] BMI > 40                          (1 Point)                                                         [ ] Anticardiolipin antibodies                                (3 Points)                   [ ] Smoking                              (1Point)                                                [ ] High homocysteine in the blood                      (3 Points)                [  ] Diabetes requiring insulin (1point)                         [ ] Other congenital or acquired thrombophilia       (3 Points)          [  ] Chemotherapy                   (1 Point)  [ ] Heparin induced thrombocytopenia                  (3 Points)             [  ] Blood Transfusion                (1 point)                                                                                                             Total Score [  8    ]                                                                                                                                                                                                                      IMPROVE Bleeding Risk Score 2    Falls Risk:   High (x  )  Mod (  )  Low (  )    3/22: Patient and daughter seen at bedside, OOB to chair. Both are RN's. Patient verbalized understanding of necessity for Xarelto for total of 12 days. She is opting for rehab due to multiple dosga t home and increased fall risk and increased risk for injury.    FAMILY HISTORY:  Family history of lung disease (Sibling)  Family history of heart valve insufficiency (Father)  Family history of COPD (chronic obstructive pulmonary disease) (Father)  Family history of diabetes mellitus in mother (Mother)  Family history of kidney cancer (Mother): mother    Denies any personal or familial history of clotting or bleeding disorders.    Allergies    Zantac (Other)    Intolerances    REVIEW OF SYSTEMS    (  )Fever	     (  )Constipation	(  )SOB				(  )Headache	(  )Dysuria  (  )Chills	     (  )Melena	(  )Dyspnea present on exertion	                    (  )Dizziness                    (  )Polyuria  (  )Nausea	     (  )Hematochezia	(  )Cough			                    (  )Syncope   	(  )Hematuria  (  )Vomiting    (  )Chest Pain	(  )Wheezing			(  )Weakness  (  )Diarrhea     (  )Palpitations	(  )Anorexia			( x )Joint pain    All  other review of systems negative: Yes    Vital Signs Last 24 Hrs  T(C): 36.9 (19 @ 11:23), Max: 37.1 (19 @ 05:12)  T(F): 98.4 (19 @ 11:23), Max: 98.7 (19 @ 05:12)  HR: 64 (19 @ 11:23) (64 - 71)  BP: 122/62 (19 @ 11:23) (111/83 - 131/71)  BP(mean): 76 (19 @ 11:23) (76 - 76)  RR: 18 (19 @ 11:23) (18 - 18)  SpO2: 97% (19 @ 11:23) (93% - 97%)          PHYSICAL EXAM:    Constitutional: Appears Well    Neurological: A& O x 3    Skin: Warm    Respiratory and Thorax: normal effort; Breath sounds: decreased bilaterally  	  Cardiovascular: S1, S2, regular, NMBR	    Gastrointestinal: BS + x 4Q, nontender	    Genitourinary:  Bladder nondistended, nontender    Musculoskeletal:   General Right:   + muscle/joint tenderness,   normal tone, no joint swelling,   ROM: limited	    General Left:   no muscle/joint tenderness,   normal tone, no joint swelling,   ROM: full    Knee:  Right: Incision: ; Dressing CDI;                 Lower extrems:   Right: no calf tenderness              negative robbin's sign               + pedal pulses    Left:   no calf tenderness              negative robbin's sign               + pedal pulses                              10.7   8.41  )-----------( 233      ( 23 Mar 2019 06:20 )             33.3           143  |  108  |  23  ----------------------------<  99  4.2   |  29  |  1.01    Ca    9.1      23 Mar 2019 06:20                                11.2   10.61 )-----------( 252      ( 22 Mar 2019 05:34 )             34.2           141  |  105  |  21  ----------------------------<  120<H>  4.8   |  31  |  1.20    Ca    9.2      22 Mar 2019 05:34        PT/INR - ( 21 Mar 2019 07:00 )   PT: 11.6 sec;   INR: 1.04 ratio                                   11.6   7.38  )-----------( 245      ( 21 Mar 2019 11:48 )             35.2       03-21    143  |  108  |  24<H>  ----------------------------<  124<H>  3.6   |  29  |  0.96    Ca    9.0      21 Mar 2019 11:48        PT/INR - ( 21 Mar 2019 07:00 )   PT: 11.6 sec;   INR: 1.04 ratio       MEDICATIONS  (STANDING):  acetaminophen   Tablet .. 650 milliGRAM(s) Oral every 6 hours  ascorbic acid 500 milliGRAM(s) Oral two times a day  calcium carbonate 1250 mG  + Vitamin D (OsCal 500 + D) 1 Tablet(s) Oral three times a day  clobetasol 0.05% Cream 1 Application(s) Topical two times a day  docusate sodium 100 milliGRAM(s) Oral three times a day  ferrous    sulfate 325 milliGRAM(s) Oral three times a day with meals  folic acid 1 milliGRAM(s) Oral daily  lactated ringers. 1000 milliLiter(s) IV Continuous <Continuous>  losartan 50 milliGRAM(s) Oral daily  multivitamin 1 Tablet(s) Oral daily  pantoprazole    Tablet 40 milliGRAM(s) Oral before breakfast  PARoxetine 40 milliGRAM(s) Oral at bedtime  polyethylene glycol 3350 17 Gram(s) Oral daily  rivaroxaban 10 milliGRAM(s) Oral daily  simvastatin 20 milliGRAM(s) Oral at bedtime        DVT Prophylaxis:  LMWH                   (  )  Heparin SQ           (  )  Coumadin             (  )  Xarelto                  ( x )  Eliquis                   (  )  Venodynes           (x  )  Ambulation          ( x )  UFH                       (  )  Contraindicated  (  )  EC ASPIRIN       (  )

## 2019-03-24 VITALS
OXYGEN SATURATION: 95 % | SYSTOLIC BLOOD PRESSURE: 110 MMHG | RESPIRATION RATE: 18 BRPM | HEART RATE: 68 BPM | TEMPERATURE: 98 F | DIASTOLIC BLOOD PRESSURE: 71 MMHG

## 2019-03-24 LAB
ANION GAP SERPL CALC-SCNC: 6 MMOL/L — SIGNIFICANT CHANGE UP (ref 5–17)
BUN SERPL-MCNC: 17 MG/DL — SIGNIFICANT CHANGE UP (ref 7–23)
CALCIUM SERPL-MCNC: 9 MG/DL — SIGNIFICANT CHANGE UP (ref 8.5–10.1)
CHLORIDE SERPL-SCNC: 106 MMOL/L — SIGNIFICANT CHANGE UP (ref 96–108)
CO2 SERPL-SCNC: 31 MMOL/L — SIGNIFICANT CHANGE UP (ref 22–31)
CREAT SERPL-MCNC: 0.98 MG/DL — SIGNIFICANT CHANGE UP (ref 0.5–1.3)
GLUCOSE SERPL-MCNC: 104 MG/DL — HIGH (ref 70–99)
HCT VFR BLD CALC: 32 % — LOW (ref 34.5–45)
HGB BLD-MCNC: 10.1 G/DL — LOW (ref 11.5–15.5)
MCHC RBC-ENTMCNC: 29.6 PG — SIGNIFICANT CHANGE UP (ref 27–34)
MCHC RBC-ENTMCNC: 31.6 GM/DL — LOW (ref 32–36)
MCV RBC AUTO: 93.8 FL — SIGNIFICANT CHANGE UP (ref 80–100)
NRBC # BLD: 0 /100 WBCS — SIGNIFICANT CHANGE UP (ref 0–0)
PLATELET # BLD AUTO: 230 K/UL — SIGNIFICANT CHANGE UP (ref 150–400)
POTASSIUM SERPL-MCNC: 4.3 MMOL/L — SIGNIFICANT CHANGE UP (ref 3.5–5.3)
POTASSIUM SERPL-SCNC: 4.3 MMOL/L — SIGNIFICANT CHANGE UP (ref 3.5–5.3)
RBC # BLD: 3.41 M/UL — LOW (ref 3.8–5.2)
RBC # FLD: 13.3 % — SIGNIFICANT CHANGE UP (ref 10.3–14.5)
SODIUM SERPL-SCNC: 143 MMOL/L — SIGNIFICANT CHANGE UP (ref 135–145)
WBC # BLD: 8.08 K/UL — SIGNIFICANT CHANGE UP (ref 3.8–10.5)
WBC # FLD AUTO: 8.08 K/UL — SIGNIFICANT CHANGE UP (ref 3.8–10.5)

## 2019-03-24 PROCEDURE — 99232 SBSQ HOSP IP/OBS MODERATE 35: CPT

## 2019-03-24 RX ADMIN — LOSARTAN POTASSIUM 50 MILLIGRAM(S): 100 TABLET, FILM COATED ORAL at 05:40

## 2019-03-24 RX ADMIN — Medication 1 TABLET(S): at 05:40

## 2019-03-24 RX ADMIN — Medication 650 MILLIGRAM(S): at 05:41

## 2019-03-24 RX ADMIN — Medication 100 MILLIGRAM(S): at 05:40

## 2019-03-24 RX ADMIN — Medication 1 APPLICATION(S): at 09:54

## 2019-03-24 RX ADMIN — PANTOPRAZOLE SODIUM 40 MILLIGRAM(S): 20 TABLET, DELAYED RELEASE ORAL at 05:40

## 2019-03-24 RX ADMIN — Medication 650 MILLIGRAM(S): at 00:06

## 2019-03-24 RX ADMIN — Medication 650 MILLIGRAM(S): at 00:15

## 2019-03-24 RX ADMIN — TRAMADOL HYDROCHLORIDE 50 MILLIGRAM(S): 50 TABLET ORAL at 07:03

## 2019-03-24 RX ADMIN — Medication 500 MILLIGRAM(S): at 05:40

## 2019-03-24 NOTE — PROGRESS NOTE ADULT - SUBJECTIVE AND OBJECTIVE BOX
HPI:  Patient is a 68y old  Female who presents with a chief complaint of inc right knee pain, h/o OA, Now S/P right TKR    Consulted by Dr. Rodas   for VTE prophylaxis, risk stratification, and anticoagulation management.    PAST MEDICAL & SURGICAL HISTORY:  Sleep apnea  Constipation  Vitamin D deficiency  Psoriasis  OA (osteoarthritis) of knee: right knee  Stuttering: on Paxil  Diabetes mellitus: metformin discontinued on 2019 - diet management  Proteinuria: h/o  COPD (chronic obstructive pulmonary disease): emphysema  HTN (hypertension)  Hyperlipidemia  Obesity  H/O cardiac catheterization: 2016  History of breast surgery: excision of breast hematoma  S/P carpal tunnel release    CrCl: 66.93    Caprini VTE Risk Score: CAPRINI SCORE  AGE RELATED RISK FACTORS                                                       MOBILITY RELATED FACTORS  [ ] Age 41-60 years                                            (1 Point)                  [ ] Bed rest                                                        (1 Point)  [ ] Age: 61-74 years                                           (2 Points)                [ ] Plaster cast                                                   (2 Points)  [ ] Age= 75 years                                              (3 Points)                 [ ] Bed bound for more than 72 hours                   (2 Points)    DISEASE RELATED RISK FACTORS                                               GENDER SPECIFIC FACTORS  [ ] Edema in the lower extremities                       (1 Point)           [ ] Pregnancy                                                            (1 Point)  [ ] Varicose veins                                               (1 Point)                  [ ] Post-partum < 6 weeks                                      (1 Point)             [ x] BMI > 25 Kg/m2                                            (1 Point)                  [ ] Hormonal therapy or oral contraception       (1 Point)                 [ ] Sepsis (in the previous month)                        (1 Point)             [ ] History of pregnancy complications                (1Point)  [ ] Pneumonia or serious lung disease                                             [ ] Unexplained or recurrent  (>/= 3), premature                                 (In the previous month)                               (1 Point)                birth with toxemia or growth-restricted infant (1 Point)  [ ] Abnormal pulmonary function test            (1 Point)                                   SURGERY RELATED RISK FACTORS  [ ] Acute myocardial infarction                       (1 Point)                  [ ]  Section                                         (1 Point)  [ ] Congestive heart failure (in the previous month) (1 Point)   [ ] Minor surgery   lasting <45 minutes       (1 Point)   [ ] Inflammatory bowel disease                             (1 Point)          [ ] Arthroscopic surgery                                  (2 Points)  [ ] Central venous access                                    (2 Points)            [ ] General surgery lasting >45 minutes      (2 Points)       [ ] Stroke (in the previous month)                  (5 Points)            [x ] Elective major lower extremity arthroplasty (5 Points)                                   [  ] Malignancy (present or past include skin melanoma                                          but exclude  basal skin cell)    (2 points)                                      TRAUMA RELATED RISK FACTORS                HEMATOLOGY RELATED FACTORS                                  [ ] Fracture of the hip, pelvis, or leg                       (5 Points)  [ ] Prior episodes of VTE                                     (3 Points)          [ ] Acute spinal cord injury (in the previous month)  (5 Points)  [ ] Positive family history for VTE                         (3 Points)       [ ] Paralysis (less than 1 month)                          (5 Points)  [ ] Prothrombin 16987 A                                      (3 Points)         [ ] Multiple Trauma (within 1month)                 (5Points)                                                                                                                                                                [ ] Factor V Leiden                                          (3 Points)                                OTHER RISK FACTORS                          [ ] Lupus anticoagulants                                     (3 Points)                       [ ] BMI > 40                          (1 Point)                                                         [ ] Anticardiolipin antibodies                                (3 Points)                   [ ] Smoking                              (1Point)                                                [ ] High homocysteine in the blood                      (3 Points)                [  ] Diabetes requiring insulin (1point)                         [ ] Other congenital or acquired thrombophilia       (3 Points)          [  ] Chemotherapy                   (1 Point)  [ ] Heparin induced thrombocytopenia                  (3 Points)             [  ] Blood Transfusion                (1 point)                                                                                                             Total Score [  8    ]                                                                                                                                                                                                                      IMPROVE Bleeding Risk Score 2    Falls Risk:   High (x  )  Mod (  )  Low (  )    3/22: Patient and daughter seen at bedside, OOB to chair. Both are RN's. Patient verbalized understanding of necessity for Xarelto for total of 12 days. She is opting for rehab due to multiple dosga t home and increased fall risk and increased risk for injury.    FAMILY HISTORY:  Family history of lung disease (Sibling)  Family history of heart valve insufficiency (Father)  Family history of COPD (chronic obstructive pulmonary disease) (Father)  Family history of diabetes mellitus in mother (Mother)  Family history of kidney cancer (Mother): mother    Denies any personal or familial history of clotting or bleeding disorders.    Allergies    Zantac (Other)    Intolerances    REVIEW OF SYSTEMS    (  )Fever	     (  )Constipation	(  )SOB				(  )Headache	(  )Dysuria  (  )Chills	     (  )Melena	(  )Dyspnea present on exertion	                    (  )Dizziness                    (  )Polyuria  (  )Nausea	     (  )Hematochezia	(  )Cough			                    (  )Syncope   	(  )Hematuria  (  )Vomiting    (  )Chest Pain	(  )Wheezing			(  )Weakness  (  )Diarrhea     (  )Palpitations	(  )Anorexia			( x )Joint pain    All  other review of systems negative: Yes    Vital Signs Last 24 Hrs  T(C): 36.9 (19 @ 05:09), Max: 37.5 (19 @ 22:03)  T(F): 98.5 (19 @ 05:09), Max: 99.5 (19 @ 22:03)  HR: 68 (19 @ 05:09) (68 - 76)  BP: 110/71 (19 @ 05:09) (110/71 - 113/68)  BP(mean): --  RR: 18 (19 @ 05:09) (17 - 18)  SpO2: 95% (19 @ 05:09) (95% - 97%)        PHYSICAL EXAM:    Constitutional: Appears Well    Neurological: A& O x 3    Skin: Warm    Respiratory and Thorax: normal effort; Breath sounds: decreased bilaterally  	  Cardiovascular: S1, S2, regular, NMBR	    Gastrointestinal: BS + x 4Q, nontender	    Genitourinary:  Bladder nondistended, nontender    Musculoskeletal:   General Right:   + muscle/joint tenderness,   normal tone, no joint swelling,   ROM: limited	    General Left:   no muscle/joint tenderness,   normal tone, no joint swelling,   ROM: full    Knee:  Right: Incision: ; Dressing CDI;                 Lower extrems:   Right: no calf tenderness              negative robbin's sign               + pedal pulses    Left:   no calf tenderness              negative robbin's sign               + pedal pulses                          10.1   8.08  )-----------( 230      ( 24 Mar 2019 05:23 )             32.0           143  |  106  |  17  ----------------------------<  104<H>  4.3   |  31  |  0.98    Ca    9.0      24 Mar 2019 05:23                                  10.7   8.41  )-----------( 233      ( 23 Mar 2019 06:20 )             33.3           143  |  108  |  23  ----------------------------<  99  4.2   |  29  |  1.01    Ca    9.1      23 Mar 2019 06:20                                11.2   10.61 )-----------( 252      ( 22 Mar 2019 05:34 )             34.2           141  |  105  |  21  ----------------------------<  120<H>  4.8   |  31  |  1.20    Ca    9.2      22 Mar 2019 05:34        PT/INR - ( 21 Mar 2019 07:00 )   PT: 11.6 sec;   INR: 1.04 ratio                                   11.6   7.38  )-----------( 245      ( 21 Mar 2019 11:48 )             35.2       03-21    143  |  108  |  24<H>  ----------------------------<  124<H>  3.6   |  29  |  0.96    Ca    9.0      21 Mar 2019 11:48        PT/INR - ( 21 Mar 2019 07:00 )   PT: 11.6 sec;   INR: 1.04 ratio       MEDICATIONS  (STANDING):  acetaminophen   Tablet .. 650 milliGRAM(s) Oral every 6 hours  ascorbic acid 500 milliGRAM(s) Oral two times a day  calcium carbonate 1250 mG  + Vitamin D (OsCal 500 + D) 1 Tablet(s) Oral three times a day  clobetasol 0.05% Cream 1 Application(s) Topical two times a day  docusate sodium 100 milliGRAM(s) Oral three times a day  ferrous    sulfate 325 milliGRAM(s) Oral three times a day with meals  folic acid 1 milliGRAM(s) Oral daily  lactated ringers. 1000 milliLiter(s) IV Continuous <Continuous>  losartan 50 milliGRAM(s) Oral daily  multivitamin 1 Tablet(s) Oral daily  pantoprazole    Tablet 40 milliGRAM(s) Oral before breakfast  PARoxetine 40 milliGRAM(s) Oral at bedtime  polyethylene glycol 3350 17 Gram(s) Oral daily  rivaroxaban 10 milliGRAM(s) Oral daily  simvastatin 20 milliGRAM(s) Oral at bedtime        DVT Prophylaxis:  LMWH                   (  )  Heparin SQ           (  )  Coumadin             (  )  Xarelto                  ( x )  Eliquis                   (  )  Venodynes           (x  )  Ambulation          ( x )  UFH                       (  )  Contraindicated  (  )  EC ASPIRIN       (  )

## 2019-03-24 NOTE — PROGRESS NOTE ADULT - REASON FOR ADMISSION
inc right knee pain, /o OA, Now S/P right TKR
Rt knee pain

## 2019-03-24 NOTE — PROGRESS NOTE ADULT - RS GEN PE MLT RESP DETAILS PC
respirations non-labored/breath sounds equal/no rales/no wheezes/no rhonchi
no wheezes/no rhonchi/no rales/respirations non-labored/breath sounds equal
no rhonchi/no wheezes/respirations non-labored/breath sounds equal/no rales

## 2019-03-24 NOTE — PROGRESS NOTE ADULT - SUBJECTIVE AND OBJECTIVE BOX
Pt is overall feeling much better, c/o mild knee pain with no new complaints.  Tolerating PT.    Date of Service: 03-24-19 @ 09:49    Vital Signs Last 24 Hrs  T(C): 36.9 (24 Mar 2019 05:09), Max: 37.5 (23 Mar 2019 22:03)  T(F): 98.5 (24 Mar 2019 05:09), Max: 99.5 (23 Mar 2019 22:03)  HR: 68 (24 Mar 2019 05:09) (64 - 76)  BP: 110/71 (24 Mar 2019 05:09) (110/71 - 122/62)  BP(mean): 76 (23 Mar 2019 11:23) (76 - 76)  RR: 18 (24 Mar 2019 05:09) (17 - 18)  SpO2: 95% (24 Mar 2019 05:09) (95% - 97%)    Daily     Daily     I&O's Detail    23 Mar 2019 07:01  -  24 Mar 2019 07:00  --------------------------------------------------------  IN:    Oral Fluid: 250 mL  Total IN: 250 mL    OUT:  Total OUT: 0 mL    Total NET: 250 mL          CAPILLARY BLOOD GLUCOSE                                          10.1   8.08  )-----------( 230      ( 24 Mar 2019 05:23 )             32.0       03-24    143  |  106  |  17  ----------------------------<  104<H>  4.3   |  31  |  0.98    Ca    9.0      24 Mar 2019 05:23                        MEDICATIONS  (STANDING):  acetaminophen   Tablet .. 650 milliGRAM(s) Oral every 6 hours  ascorbic acid 500 milliGRAM(s) Oral two times a day  calcium carbonate 1250 mG  + Vitamin D (OsCal 500 + D) 1 Tablet(s) Oral three times a day  clobetasol 0.05% Cream 1 Application(s) Topical two times a day  docusate sodium 100 milliGRAM(s) Oral three times a day  ferrous    sulfate 325 milliGRAM(s) Oral three times a day with meals  folic acid 1 milliGRAM(s) Oral daily  lactated ringers. 1000 milliLiter(s) (75 mL/Hr) IV Continuous <Continuous>  losartan 50 milliGRAM(s) Oral daily  multivitamin 1 Tablet(s) Oral daily  pantoprazole    Tablet 40 milliGRAM(s) Oral before breakfast  PARoxetine 40 milliGRAM(s) Oral at bedtime  polyethylene glycol 3350 17 Gram(s) Oral daily  rivaroxaban 10 milliGRAM(s) Oral daily  simvastatin 20 milliGRAM(s) Oral at bedtime    MEDICATIONS  (PRN):  acetaminophen   Tablet .. 650 milliGRAM(s) Oral every 6 hours PRN Temp greater or equal to 38C (100.4F), Mild Pain (1 - 3)  ALBUTerol    90 MICROgram(s) HFA Inhaler 2 Puff(s) Inhalation every 6 hours PRN Shortness of Breath and/or Wheezing  aluminum hydroxide/magnesium hydroxide/simethicone Suspension 30 milliLiter(s) Oral four times a day PRN Indigestion  benzocaine 15 mG/menthol 3.6 mG Lozenge 1 Lozenge Oral every 3 hours PRN Sore Throat  bisacodyl Suppository 10 milliGRAM(s) Rectal daily PRN If no bowel movement by postoperative day #2  diphenhydrAMINE 25 milliGRAM(s) Oral every 4 hours PRN Rash and/or Itching  diphenhydrAMINE 25 milliGRAM(s) Oral at bedtime PRN Insomnia  HYDROmorphone  Injectable 1 milliGRAM(s) SubCutaneous every 4 hours PRN Severe Pain (7 - 10)  magnesium hydroxide Suspension 30 milliLiter(s) Oral daily PRN Constipation  magnesium hydroxide Suspension 30 milliLiter(s) Oral four times a day PRN Constipation  ondansetron Injectable 4 milliGRAM(s) IV Push every 6 hours PRN Nausea and/or Vomiting  oxyCODONE    IR 10 milliGRAM(s) Oral every 4 hours PRN Moderate Pain (4 - 6)  senna 2 Tablet(s) Oral at bedtime PRN Constipation  traMADol 50 milliGRAM(s) Oral every 6 hours PRN Mild Pain (1 - 3)

## 2019-03-24 NOTE — PROGRESS NOTE ADULT - GASTROINTESTINAL DETAILS
no distention/soft/nontender/bowel sounds normal
soft/nontender/bowel sounds normal/no distention
soft/nontender/no distention/bowel sounds normal

## 2019-03-24 NOTE — PROGRESS NOTE ADULT - ASSESSMENT
68F s/p R TKA POD 1  Analgesia  DVT ppx  WBAT RLE  PT/OT  Incentive spirometry  DC planning
68F s/p R TKA POD 2  Analgesia  DVT ppx  WBAT RLE  PT/OT  Incentive spirometry  DC planning
68F s/p R TKA POD 3  Dressing changed  Analgesia  DVT ppx  Incentive spirometry  WBAT  P/T  Discharge planning
A: 68F s/p Right TKA     P:  WBAT RLE   therapy   DVT ppx   post op abx  venodynes  incentive spirometer  pain control   follow labs  no pillow under knee
A;  67 yo female S/P right TKR with high VTE risk due to age, surgery , immobility and high BMI, Consulted by Dr Rodas for DVT/PE prophylaxis, risk stratification and Anticoagulation Management    P: D/W pt risks vs benefits and is agreeable to use Xarelto    ::    rehab today at [place] will f/u post rehab with Anticoag services,  contact # provided  Continue Xarelto 10 mg po today and cont x 12 days  3/21------>4/1  ::cont Protonix 40 mg po daily while on Xarelto  ::Daily CBC, BMP    ::INC Mobility as shiv    Dispo: Rehab; [place]
A;  67 yo female S/P right TKR with high VTE risk due to age, surgery , immobility and high BMI, Consulted by Dr Rodas for DVT/PE prophylaxis, risk stratification and Anticoagulation Management    P: D/W pt risks vs benefits and is agreeable to use Xarelto    ::Continue Xarelto 10 mg po today and cont x 12 days  ::Protonix 40 mg po daily while on Xarelto  ::Daily CBC, BMP  ::Venodynes BLE  ::INC Mobility as shiv    Dispo: Rehab    Will continue to follow.
A;  69 yo female S/P right TKR with high VTE risk due to age, surgery , immobility and high BMI, Consulted by Dr Rodas for DVT/PE prophylaxis, risk stratification and Anticoagulation Management    P: D/W pt risks vs benefits and is agreeable to use Xarelto    ::Continue Xarelto 10 mg po today and cont x 12 days  3/21------>4/1  ::cont Protonix 40 mg po daily while on Xarelto  ::Daily CBC, BMP  ::Venodynes BLE  ::INC Mobility as shiv    Dispo: Rehab    Will continue to follow.
NV intact tolerated PT
doing well  NV intact  observed walking in hallway  WBAT    pt has no support for home,    will plan for rehab d/c rossi
Pt is a 67 y/o obese female with h/o HTN, hyperlipidemia, diet controlled type 2 diabetes, COPD, hyperlipidemia, sleep apnea, osteoarthritis who has been having increasing Rt knee pain.  Since she failed conservative measures, affecting her quality of life and ADLs she was admitted for elective Rt total knee replacement.  Postop medicine consult called for comanagement.      * Osteoarthritis-s/p Rt TKR, doing well, continue pain control, monitor post op labs, DVT proph. encourage use of incentive spirometry.  * Acute Blood Loss Anemia-surgical loss, monitor, po iron, stable  * GERD- protonix  * HTN-continue losartan but hold HCTZ in postop period, can resume at discharge  * Hyperlipidemia-statins  * Type 2 Diabetes-diet controlled, last Hga1c per pt was 5.6, no need for bgm for now.   * Anxiety-paroxetine  * COPD-stable  * Proph- xarelto per anticoagulation team  * Disp-OOB, PT, d/c planning  * Comm- d/w pt in details, RN
Pt is a 67 y/o obese female with h/o HTN, hyperlipidemia, diet controlled type 2 diabetes, COPD, hyperlipidemia, sleep apnea, osteoarthritis who has been having increasing Rt knee pain.  Since she failed conservative measures, affecting her quality of life and ADLs she was admitted for elective Rt total knee replacement.  Postop medicine consult called for comanagement.      * Osteoarthritis-s/p Rt TKR, doing well, continue pain control, monitor post op labs, DVT proph. encourage use of incentive spirometry.  * Acute Blood Loss Anemia-surgical loss, monitor, po iron, stable  * GERD- protonix  * HTN-continue losartan, resume HCTZ at discharge  * Hyperlipidemia-statins  * Type 2 Diabetes-diet controlled, last Hga1c per pt was 5.6, no need for bgm for now.   * Anxiety-paroxetine  * COPD-stable  * Proph- xarelto per anticoagulation team   * Disp-OOB, PT, d/c planning for rehab today  * Comm- d/w pt in details, RN
Pt is a 67 y/o obese female with h/o HTN, hyperlipidemia, diet controlled type 2 diabetes, COPD, hyperlipidemia, sleep apnea, osteoarthritis who has been having increasing Rt knee pain.  Since she failed conservative measures, affecting her quality of life and ADLs she was admitted for elective Rt total knee replacement.  Postop medicine consult called for comanagement.      * Osteoarthritis-s/p Rt TKR, continue pain control, monitor post op labs, DVT proph. encouraged use of incentive spirometry.  * Nausea-? due to anesthesia ? GERD or combination of both, zofran prn, resolved.  * Acute Blood Loss Anemia-surgical loss, monitor, po iron  * GERD-improved, continue protonix  * HTN-continue losartan but hold HCTZ in postop period  * Hyperlipidemia-statins  * Type 2 Diabetes-diet controlled, last Hga1c per pt was 5.6, no need for bgm for now.   * Anxiety-paroxetine  * COPD-stable  * Proph- xarelto per anticoagulation team  * Disp-OOB, PT, d/c planning  * Comm- d/w pt in details, RN

## 2019-03-26 LAB — SURGICAL PATHOLOGY FINAL REPORT - CH: SIGNIFICANT CHANGE UP

## 2019-03-27 DIAGNOSIS — G47.30 SLEEP APNEA, UNSPECIFIED: ICD-10-CM

## 2019-03-27 DIAGNOSIS — M25.561 PAIN IN RIGHT KNEE: ICD-10-CM

## 2019-03-27 DIAGNOSIS — E55.9 VITAMIN D DEFICIENCY, UNSPECIFIED: ICD-10-CM

## 2019-03-27 DIAGNOSIS — M17.11 UNILATERAL PRIMARY OSTEOARTHRITIS, RIGHT KNEE: ICD-10-CM

## 2019-03-27 DIAGNOSIS — Z87.891 PERSONAL HISTORY OF NICOTINE DEPENDENCE: ICD-10-CM

## 2019-03-27 DIAGNOSIS — E66.9 OBESITY, UNSPECIFIED: ICD-10-CM

## 2019-03-27 DIAGNOSIS — K59.00 CONSTIPATION, UNSPECIFIED: ICD-10-CM

## 2019-03-27 DIAGNOSIS — D62 ACUTE POSTHEMORRHAGIC ANEMIA: ICD-10-CM

## 2019-03-27 DIAGNOSIS — L40.9 PSORIASIS, UNSPECIFIED: ICD-10-CM

## 2019-03-27 DIAGNOSIS — F80.81 CHILDHOOD ONSET FLUENCY DISORDER: ICD-10-CM

## 2019-03-27 DIAGNOSIS — J44.9 CHRONIC OBSTRUCTIVE PULMONARY DISEASE, UNSPECIFIED: ICD-10-CM

## 2019-03-27 DIAGNOSIS — E78.5 HYPERLIPIDEMIA, UNSPECIFIED: ICD-10-CM

## 2019-03-27 DIAGNOSIS — K21.9 GASTRO-ESOPHAGEAL REFLUX DISEASE WITHOUT ESOPHAGITIS: ICD-10-CM

## 2019-03-27 DIAGNOSIS — Z79.84 LONG TERM (CURRENT) USE OF ORAL HYPOGLYCEMIC DRUGS: ICD-10-CM

## 2019-03-27 DIAGNOSIS — F41.9 ANXIETY DISORDER, UNSPECIFIED: ICD-10-CM

## 2019-03-27 DIAGNOSIS — I10 ESSENTIAL (PRIMARY) HYPERTENSION: ICD-10-CM

## 2019-03-27 DIAGNOSIS — E11.9 TYPE 2 DIABETES MELLITUS WITHOUT COMPLICATIONS: ICD-10-CM

## 2019-05-10 NOTE — PHYSICAL THERAPY INITIAL EVALUATION ADULT - ADL SKILLS, REHAB EVAL
Ms. Rosita Almodovar is a 78 year old female with a PMHx of acquired hypothyroidism, essential HTN, CAD, HLP and COPD, who was transferred from [facility] for evaluation of a newly diagnosed cerebellar lesion. She initially presented to the OSH with complaints of a 3 month history of unexplained weight loss, a 1 month history of increasing gait instability, and acute onset of dysarthria. Initial non contrasted MRI of the brain showed an acute or early subacute infarct in the posteromedial left frontal cortex and an area of abnormal signal in the cerebellum. Contrasted MRI showed a ring enhancing lesion in the left cerebellum and the left frontal cortex. Patient denies any previous history of cancer or stroke. Denies any use of anti coagulation or anti platelet therapy. She quit smoking in 2014. Currently, she denies a headache, vision changes, or worsening incontinence (some urinary incontinence at baseline). She feels that her speech has returned to normal. She continues to report intermittent dizziness, gait instability, and left sided weakness. Denies any progression of these symptoms.      independent

## 2021-04-15 NOTE — H&P PST ADULT - ALLERGIC/IMMUNOLOGIC
negative Implemented All Universal Safety Interventions:  [place] to call system. Call bell, personal items and telephone within reach. Instruct patient to call for assistance. Room bathroom lighting operational. Non-slip footwear when patient is off stretcher. Physically safe environment: no spills, clutter or unnecessary equipment. Stretcher in lowest position, wheels locked, appropriate side rails in place.

## 2021-07-21 NOTE — H&P PST ADULT - PROBLEM SELECTOR PROBLEM 1
Quality 226: Preventive Care And Screening: Tobacco Use: Screening And Cessation Intervention: Patient screened for tobacco use and is an ex/non-smoker Quality 130: Documentation Of Current Medications In The Medical Record: Current Medications Documented Detail Level: Detailed Quality 431: Preventive Care And Screening: Unhealthy Alcohol Use - Screening: Patient screened for unhealthy alcohol use using a single question and scores less than 2 times per year Quality 110: Preventive Care And Screening: Influenza Immunization: Influenza Immunization Administered during Influenza season Need for prophylactic measure

## 2021-12-06 NOTE — CONSULT NOTE ADULT - EYES
[Normal] : soft, non-tender, non-distended, no masses palpated, no HSM and normal bowel sounds [de-identified] : increase pain with flexion. [de-identified] : SI joint tenderness  detailed exam conjunctiva clear/EOMI

## 2022-10-12 NOTE — CONSULT NOTE ADULT - VASCULAR
detailed exam Fluconazole Counseling:  Patient counseled regarding adverse effects of fluconazole including but not limited to headache, diarrhea, nausea, upset stomach, liver function test abnormalities, taste disturbance, and stomach pain.  There is a rare possibility of liver failure that can occur when taking fluconazole.  The patient understands that monitoring of LFTs and kidney function test may be required, especially at baseline. The patient verbalized understanding of the proper use and possible adverse effects of fluconazole.  All of the patient's questions and concerns were addressed.

## 2022-12-05 NOTE — PROGRESS NOTE ADULT - CARDIOVASCULAR
[No Acute Distress] : no acute distress [Well Nourished] : well nourished [Well Developed] : well developed [Well-Appearing] : well-appearing [Normal Sclera/Conjunctiva] : normal sclera/conjunctiva [PERRL] : pupils equal round and reactive to light negative [EOMI] : extraocular movements intact [Normal Outer Ear/Nose] : the outer ears and nose were normal in appearance [Normal Oropharynx] : the oropharynx was normal [No JVD] : no jugular venous distention [No Lymphadenopathy] : no lymphadenopathy [Supple] : supple [Thyroid Normal, No Nodules] : the thyroid was normal and there were no nodules present [No Respiratory Distress] : no respiratory distress  [No Accessory Muscle Use] : no accessory muscle use [Clear to Auscultation] : lungs were clear to auscultation bilaterally [Normal Rate] : normal rate  [Regular Rhythm] : with a regular rhythm [Normal S1, S2] : normal S1 and S2 [No Murmur] : no murmur heard [No Carotid Bruits] : no carotid bruits [No Abdominal Bruit] : a ~M bruit was not heard ~T in the abdomen [No Varicosities] : no varicosities [Pedal Pulses Present] : the pedal pulses are present [No Edema] : there was no peripheral edema [No Palpable Aorta] : no palpable aorta [No Extremity Clubbing/Cyanosis] : no extremity clubbing/cyanosis [Soft] : abdomen soft detailed exam [Non Tender] : non-tender [Non-distended] : non-distended [No Masses] : no abdominal mass palpated [No HSM] : no HSM [Normal Bowel Sounds] : normal bowel sounds [Normal Posterior Cervical Nodes] : no posterior cervical lymphadenopathy [Normal Anterior Cervical Nodes] : no anterior cervical lymphadenopathy [No CVA Tenderness] : no CVA  tenderness [No Spinal Tenderness] : no spinal tenderness [Grossly Normal Strength/Tone] : grossly normal strength/tone [No Focal Deficits] : no focal deficits [Normal Gait] : normal gait [Normal Affect] : the affect was normal [Normal Insight/Judgement] : insight and judgment were intact [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

## 2024-07-03 ENCOUNTER — NON-APPOINTMENT (OUTPATIENT)
Age: 74
End: 2024-07-03

## 2024-07-03 ENCOUNTER — APPOINTMENT (OUTPATIENT)
Dept: OPHTHALMOLOGY | Facility: CLINIC | Age: 74
End: 2024-07-03
Payer: MEDICARE

## 2024-07-03 PROCEDURE — 92004 COMPRE OPH EXAM NEW PT 1/>: CPT

## 2024-07-03 PROCEDURE — 76514 ECHO EXAM OF EYE THICKNESS: CPT

## 2024-07-03 PROCEDURE — 92136 OPHTHALMIC BIOMETRY: CPT

## 2024-07-03 PROCEDURE — 92133 CPTRZD OPH DX IMG PST SGM ON: CPT

## 2024-12-19 NOTE — PHYSICAL THERAPY INITIAL EVALUATION ADULT - HEALTH SCREEN CRITERIA
-Please refer to following educational material.  -Please contact the office with any after visit questions.  -Please follow up with your primary provider as discussed.   -Please go to ER with any concerning / worrisome symptoms.    yes

## 2025-01-13 ENCOUNTER — APPOINTMENT (OUTPATIENT)
Dept: OPHTHALMOLOGY | Facility: CLINIC | Age: 75
End: 2025-01-13
Payer: MEDICARE

## 2025-01-13 ENCOUNTER — NON-APPOINTMENT (OUTPATIENT)
Age: 75
End: 2025-01-13

## 2025-01-13 PROCEDURE — 92083 EXTENDED VISUAL FIELD XM: CPT

## 2025-01-13 PROCEDURE — 92012 INTRM OPH EXAM EST PATIENT: CPT

## 2025-03-19 ENCOUNTER — NON-APPOINTMENT (OUTPATIENT)
Age: 75
End: 2025-03-19

## 2025-03-19 ENCOUNTER — APPOINTMENT (OUTPATIENT)
Dept: OPHTHALMOLOGY | Facility: CLINIC | Age: 75
End: 2025-03-19
Payer: MEDICARE

## 2025-03-19 PROCEDURE — 92012 INTRM OPH EXAM EST PATIENT: CPT

## 2025-03-19 PROCEDURE — 92136 OPHTHALMIC BIOMETRY: CPT

## 2025-05-08 ENCOUNTER — APPOINTMENT (OUTPATIENT)
Dept: OPHTHALMOLOGY | Facility: AMBULATORY SURGERY CENTER | Age: 75
End: 2025-05-08
Payer: MEDICARE

## 2025-05-08 PROCEDURE — 66984 XCAPSL CTRC RMVL W/O ECP: CPT | Mod: RT

## 2025-05-09 ENCOUNTER — NON-APPOINTMENT (OUTPATIENT)
Age: 75
End: 2025-05-09

## 2025-05-09 ENCOUNTER — APPOINTMENT (OUTPATIENT)
Dept: OPHTHALMOLOGY | Facility: CLINIC | Age: 75
End: 2025-05-09
Payer: MEDICARE

## 2025-05-09 PROCEDURE — 99024 POSTOP FOLLOW-UP VISIT: CPT

## 2025-05-13 ENCOUNTER — APPOINTMENT (OUTPATIENT)
Dept: OPHTHALMOLOGY | Facility: CLINIC | Age: 75
End: 2025-05-13
Payer: MEDICARE

## 2025-05-13 ENCOUNTER — NON-APPOINTMENT (OUTPATIENT)
Age: 75
End: 2025-05-13

## 2025-05-13 PROCEDURE — 99024 POSTOP FOLLOW-UP VISIT: CPT

## 2025-06-12 ENCOUNTER — APPOINTMENT (OUTPATIENT)
Dept: OPHTHALMOLOGY | Facility: AMBULATORY SURGERY CENTER | Age: 75
End: 2025-06-12
Payer: MEDICARE

## 2025-06-12 PROCEDURE — 66984 XCAPSL CTRC RMVL W/O ECP: CPT | Mod: LT,79

## 2025-06-13 ENCOUNTER — APPOINTMENT (OUTPATIENT)
Dept: OPHTHALMOLOGY | Facility: CLINIC | Age: 75
End: 2025-06-13
Payer: MEDICARE

## 2025-06-13 ENCOUNTER — NON-APPOINTMENT (OUTPATIENT)
Age: 75
End: 2025-06-13

## 2025-06-13 PROCEDURE — 99024 POSTOP FOLLOW-UP VISIT: CPT

## 2025-06-18 ENCOUNTER — APPOINTMENT (OUTPATIENT)
Dept: OPHTHALMOLOGY | Facility: CLINIC | Age: 75
End: 2025-06-18
Payer: MEDICARE

## 2025-06-18 ENCOUNTER — NON-APPOINTMENT (OUTPATIENT)
Age: 75
End: 2025-06-18

## 2025-06-18 PROCEDURE — 99024 POSTOP FOLLOW-UP VISIT: CPT

## 2025-07-16 ENCOUNTER — APPOINTMENT (OUTPATIENT)
Dept: OPHTHALMOLOGY | Facility: CLINIC | Age: 75
End: 2025-07-16
Payer: MEDICARE

## 2025-07-16 ENCOUNTER — NON-APPOINTMENT (OUTPATIENT)
Age: 75
End: 2025-07-16

## 2025-07-16 PROCEDURE — 99024 POSTOP FOLLOW-UP VISIT: CPT
